# Patient Record
Sex: FEMALE | Employment: FULL TIME | URBAN - METROPOLITAN AREA
[De-identification: names, ages, dates, MRNs, and addresses within clinical notes are randomized per-mention and may not be internally consistent; named-entity substitution may affect disease eponyms.]

---

## 2017-04-13 ENCOUNTER — OFFICE VISIT (OUTPATIENT)
Dept: FAMILY MEDICINE CLINIC | Facility: CLINIC | Age: 42
End: 2017-04-13

## 2017-04-13 VITALS
TEMPERATURE: 99 F | WEIGHT: 158 LBS | BODY MASS INDEX: 28 KG/M2 | DIASTOLIC BLOOD PRESSURE: 70 MMHG | HEART RATE: 72 BPM | SYSTOLIC BLOOD PRESSURE: 107 MMHG | HEIGHT: 63 IN

## 2017-04-13 DIAGNOSIS — G56.03 BILATERAL CARPAL TUNNEL SYNDROME: ICD-10-CM

## 2017-04-13 DIAGNOSIS — Z00.00 ROUTINE PHYSICAL EXAMINATION: Primary | ICD-10-CM

## 2017-04-13 DIAGNOSIS — R73.01 IMPAIRED FASTING GLUCOSE: ICD-10-CM

## 2017-04-13 DIAGNOSIS — S03.40XA TMJ (SPRAIN OF TEMPOROMANDIBULAR JOINT), INITIAL ENCOUNTER: ICD-10-CM

## 2017-04-13 PROCEDURE — 90715 TDAP VACCINE 7 YRS/> IM: CPT | Performed by: FAMILY MEDICINE

## 2017-04-13 PROCEDURE — 99396 PREV VISIT EST AGE 40-64: CPT | Performed by: FAMILY MEDICINE

## 2017-04-13 PROCEDURE — 90471 IMMUNIZATION ADMIN: CPT | Performed by: FAMILY MEDICINE

## 2017-04-13 NOTE — PROGRESS NOTES
REASON FOR VISIT:    Shahid Hess is a 42year old female who presents for an [address].         Patient Active Problem List:     Routine physical examination     TMJ (sprain of temporomandibular joint)     Impaired fasting glucose Procedure   Cholesterol Screening Recommended screening varies with age, risk and gender No results found for: LDL, LDLC    Diabetes Screening  if history of high blood pressure or other  risk factors No results found for: A1C, HGBA1C  No results found for Kayla Safer Father    • Diabetes Mother    • Hypertension Mother    • Cancer Maternal Grandmother       SOCIAL HISTORY:     Smoking Status: Never Smoker                      Alcohol Use: Yes                Comment: Occ 1-2 per year    Occ:  Toni Shield B/L   NO CEREBELLAR SIGNS  ASSESSMENT AND OTHER RELEVANT CHRONIC CONDITIONS:   Pelon Roger is a 42year old female who presents for an [address].      PLAN SUMMARY:   Diagnoses and all orders for this visit:    Routine physical examin

## 2017-04-14 ENCOUNTER — APPOINTMENT (OUTPATIENT)
Dept: LAB | Age: 42
End: 2017-04-14
Attending: FAMILY MEDICINE
Payer: COMMERCIAL

## 2017-04-14 DIAGNOSIS — S03.40XA TMJ (SPRAIN OF TEMPOROMANDIBULAR JOINT), INITIAL ENCOUNTER: ICD-10-CM

## 2017-04-14 DIAGNOSIS — R73.01 IMPAIRED FASTING GLUCOSE: ICD-10-CM

## 2017-04-14 DIAGNOSIS — Z00.00 ROUTINE PHYSICAL EXAMINATION: ICD-10-CM

## 2017-04-14 PROCEDURE — 36415 COLL VENOUS BLD VENIPUNCTURE: CPT

## 2017-04-14 PROCEDURE — 80061 LIPID PANEL: CPT

## 2017-04-14 PROCEDURE — 82947 ASSAY GLUCOSE BLOOD QUANT: CPT

## 2017-04-14 PROCEDURE — 83036 HEMOGLOBIN GLYCOSYLATED A1C: CPT

## 2017-08-01 ENCOUNTER — TELEPHONE (OUTPATIENT)
Dept: FAMILY MEDICINE CLINIC | Facility: CLINIC | Age: 42
End: 2017-08-01

## 2017-08-01 NOTE — TELEPHONE ENCOUNTER
Actions Requested: appt made with  [facility] for Wed 9am at [facility]. Patient aware to go to ER if symptoms significantly worsen. Problem: abd pain, left side  Onset and Timing: comes/goes  Associated Symptoms: nausea  Aggravating by:    Alleviated by:   Triage No

## 2017-08-01 NOTE — TELEPHONE ENCOUNTER
FYI - Greenlandic Only    Patient is calling because she is experiencing abdominal pain - on her left side beneath the ribs, states it goes all the way to her back. States that it is also causing nausea. Originally called for an appt.  But majority of physician

## 2017-08-02 ENCOUNTER — TELEPHONE (OUTPATIENT)
Dept: GASTROENTEROLOGY | Facility: CLINIC | Age: 42
End: 2017-08-02

## 2017-08-02 ENCOUNTER — LAB ENCOUNTER (OUTPATIENT)
Dept: LAB | Age: 42
End: 2017-08-02
Attending: FAMILY MEDICINE
Payer: COMMERCIAL

## 2017-08-02 ENCOUNTER — OFFICE VISIT (OUTPATIENT)
Dept: FAMILY MEDICINE CLINIC | Facility: CLINIC | Age: 42
End: 2017-08-02

## 2017-08-02 VITALS
DIASTOLIC BLOOD PRESSURE: 85 MMHG | HEART RATE: 83 BPM | WEIGHT: 155 LBS | SYSTOLIC BLOOD PRESSURE: 122 MMHG | BODY MASS INDEX: 27 KG/M2

## 2017-08-02 DIAGNOSIS — R10.11 RUQ ABDOMINAL PAIN: ICD-10-CM

## 2017-08-02 DIAGNOSIS — K92.1 HEMATOCHEZIA: ICD-10-CM

## 2017-08-02 DIAGNOSIS — R10.12 LUQ ABDOMINAL PAIN: ICD-10-CM

## 2017-08-02 DIAGNOSIS — R10.12 LUQ ABDOMINAL PAIN: Primary | ICD-10-CM

## 2017-08-02 LAB
ALBUMIN SERPL BCP-MCNC: 3.6 G/DL (ref 3.5–4.8)
ALP SERPL-CCNC: 66 U/L (ref 32–100)
ALT SERPL-CCNC: 15 U/L (ref 14–54)
AMYLASE SERPL-CCNC: 55 U/L (ref 24–108)
APPEARANCE: CLEAR
AST SERPL-CCNC: 19 U/L (ref 15–41)
BASOPHILS # BLD: 0.1 K/UL (ref 0–0.2)
BASOPHILS NFR BLD: 1 %
BILIRUB DIRECT SERPL-MCNC: 0 MG/DL (ref 0–0.2)
BILIRUB SERPL-MCNC: 0.3 MG/DL (ref 0.3–1.2)
EOSINOPHIL # BLD: 0.4 K/UL (ref 0–0.7)
EOSINOPHIL NFR BLD: 6 %
ERYTHROCYTE [DISTWIDTH] IN BLOOD BY AUTOMATED COUNT: 14.8 % (ref 11–15)
HCG SERPL QL: NEGATIVE
HCT VFR BLD AUTO: 34.9 % (ref 35–48)
HGB BLD-MCNC: 11.2 G/DL (ref 12–16)
LIPASE SERPL-CCNC: 22 U/L (ref 22–51)
LYMPHOCYTES # BLD: 1.6 K/UL (ref 1–4)
LYMPHOCYTES NFR BLD: 24 %
MCH RBC QN AUTO: 26.6 PG (ref 27–32)
MCHC RBC AUTO-ENTMCNC: 32.2 G/DL (ref 32–37)
MCV RBC AUTO: 82.6 FL (ref 80–100)
MONOCYTES # BLD: 0.5 K/UL (ref 0–1)
MONOCYTES NFR BLD: 8 %
MULTISTIX LOT#: NORMAL NUMERIC
NEUTROPHILS # BLD AUTO: 4 K/UL (ref 1.8–7.7)
NEUTROPHILS NFR BLD: 61 %
OCCULT BLOOD: POSITIVE
PH, URINE: 8 (ref 4.5–8)
PLATELET # BLD AUTO: 250 K/UL (ref 140–400)
PMV BLD AUTO: 10.3 FL (ref 7.4–10.3)
PROT SERPL-MCNC: 6.9 G/DL (ref 5.9–8.4)
RBC # BLD AUTO: 4.22 M/UL (ref 3.7–5.4)
SPECIFIC GRAVITY: 1 (ref 1–1.03)
URINE-COLOR: YELLOW
WBC # BLD AUTO: 6.5 K/UL (ref 4–11)

## 2017-08-02 PROCEDURE — 81002 URINALYSIS NONAUTO W/O SCOPE: CPT | Performed by: FAMILY MEDICINE

## 2017-08-02 PROCEDURE — 84703 CHORIONIC GONADOTROPIN ASSAY: CPT

## 2017-08-02 PROCEDURE — 36415 COLL VENOUS BLD VENIPUNCTURE: CPT

## 2017-08-02 PROCEDURE — 99214 OFFICE O/P EST MOD 30 MIN: CPT | Performed by: FAMILY MEDICINE

## 2017-08-02 PROCEDURE — 82150 ASSAY OF AMYLASE: CPT

## 2017-08-02 PROCEDURE — 83690 ASSAY OF LIPASE: CPT

## 2017-08-02 PROCEDURE — 85025 COMPLETE CBC W/AUTO DIFF WBC: CPT

## 2017-08-02 PROCEDURE — 99212 OFFICE O/P EST SF 10 MIN: CPT | Performed by: FAMILY MEDICINE

## 2017-08-02 PROCEDURE — 80076 HEPATIC FUNCTION PANEL: CPT

## 2017-08-02 RX ORDER — NICOTINE POLACRILEX 4 MG/1
1 GUM, CHEWING ORAL DAILY
Qty: 30 TABLET | Refills: 3 | Status: SHIPPED | OUTPATIENT
Start: 2017-08-02 | End: 2017-09-01

## 2017-08-02 NOTE — TELEPHONE ENCOUNTER
Pt called back and accepted below appt with Jan Verdin at [facility] and given detailed directions to [facility] location.  States she has referral.

## 2017-08-02 NOTE — TELEPHONE ENCOUNTER
Ben Garcia from Dr Malgorzata Chun' office called. Pt would like to be seen for blood in stool.   Ben Garcia states pt already down at lab, but she will call down there and give pt appt tomorrow with Chaz POND at 7:30am at the [facility], [address], and will giv

## 2017-08-02 NOTE — PROGRESS NOTES
Blood pressure 122/85, pulse 83, weight 155 lb (70.3 kg), last menstrual period 08/02/2017. Patient presents today complaining of left upper quadrant abdominal pain she has had on and off for several months now increasing lately.   She has had her append

## 2017-08-03 ENCOUNTER — APPOINTMENT (OUTPATIENT)
Dept: LAB | Age: 42
End: 2017-08-03
Attending: FAMILY MEDICINE
Payer: COMMERCIAL

## 2017-08-03 ENCOUNTER — TELEPHONE (OUTPATIENT)
Dept: GASTROENTEROLOGY | Facility: CLINIC | Age: 42
End: 2017-08-03

## 2017-08-03 ENCOUNTER — TELEPHONE (OUTPATIENT)
Dept: FAMILY MEDICINE CLINIC | Facility: CLINIC | Age: 42
End: 2017-08-03

## 2017-08-03 ENCOUNTER — OFFICE VISIT (OUTPATIENT)
Dept: GASTROENTEROLOGY | Facility: CLINIC | Age: 42
End: 2017-08-03

## 2017-08-03 VITALS
SYSTOLIC BLOOD PRESSURE: 125 MMHG | HEIGHT: 64 IN | HEART RATE: 60 BPM | WEIGHT: 155 LBS | DIASTOLIC BLOOD PRESSURE: 84 MMHG | BODY MASS INDEX: 26.46 KG/M2

## 2017-08-03 DIAGNOSIS — D64.9 ANEMIA, UNSPECIFIED TYPE: Primary | ICD-10-CM

## 2017-08-03 DIAGNOSIS — R10.12 LUQ ABDOMINAL PAIN: ICD-10-CM

## 2017-08-03 DIAGNOSIS — R12 HEARTBURN: ICD-10-CM

## 2017-08-03 DIAGNOSIS — K62.5 RECTAL BLEEDING: ICD-10-CM

## 2017-08-03 DIAGNOSIS — R10.12 LUQ PAIN: ICD-10-CM

## 2017-08-03 DIAGNOSIS — K92.1 HEMATOCHEZIA: ICD-10-CM

## 2017-08-03 DIAGNOSIS — R31.9 HEMATURIA, UNSPECIFIED TYPE: Primary | ICD-10-CM

## 2017-08-03 DIAGNOSIS — R10.11 RUQ ABDOMINAL PAIN: ICD-10-CM

## 2017-08-03 PROCEDURE — 99212 OFFICE O/P EST SF 10 MIN: CPT | Performed by: PHYSICIAN ASSISTANT

## 2017-08-03 PROCEDURE — 99244 OFF/OP CNSLTJ NEW/EST MOD 40: CPT | Performed by: PHYSICIAN ASSISTANT

## 2017-08-03 PROCEDURE — 87338 HPYLORI STOOL AG IA: CPT

## 2017-08-03 NOTE — TELEPHONE ENCOUNTER
Verbal authorization by [facility] [place] to order UA. Patient made aware to complete blood test after finishing menstrual cycle.

## 2017-08-03 NOTE — PATIENT INSTRUCTIONS
1. Constipation: I want you to start Miralax each day (over the counter in a purple bottle). You can start with 17 g (or one capful) in a glass of water or juice in the morning. Try this for 2 weeks. You can go up to twice a day with this.   - Drink at leas

## 2017-08-03 NOTE — TELEPHONE ENCOUNTER
Scheduled for:  Colon  Provider Name: Dr. Boris Negron  Date:  9-8-17  Location:  [facility]  Sedation:  IV sedation   Time:  10am, arrival 9am  Prep: Colyte  Meds/Allergies Reconciled?:  Yes   Diagnosis with codes:  Anemia D64.9, rectal bleeding K62.5  Was patient infor

## 2017-08-03 NOTE — H&P
[address] [facility] [address] Gastroenterology                                                                                                  Clinic History and Physical     Pa when she tries her home remedies, she has a bowel movement each day. She does admit to hard stools and she sees blood in the stool with BRBPR. Last time she saw blood was 2 days ago. Normal BM this AM without blood or straining.  Does admit she does not shortness of breath  CARDIOVASCULAR:  negative for crushing sub-sternal chest pain  GASTROINTESTINAL:  see HPI  GENITOURINARY:  negative for dysuria or gross hematuria  INTEGUMENT/BREAST:  SKIN:  negative for jaundice   ALLERGIC/IMMUNOLOGIC:  negative for enzymes were WNL. No pain presently during examination. ? MSK versus gastric versus biliary (although normal hepatic enzymes)  #Heartburn: no worrisome symptoms presently to the patient, started on PPI only today, therefore efficacy cannot be truly gauged. demonstrated understanding], including but not limited to the risks of bleeding, infection, pain, death, as well as the risks of anesthesia and perforation all leading to prolonged hospitalization, surgical intervention, or even death.  I also specifically

## 2017-08-03 NOTE — TELEPHONE ENCOUNTER
----- Message from Ignacio Jo DO sent at 8/3/2017  1:02 PM CDT -----  PLEASE CHECK IF UA WAS SENT TO LAB FOR MICRO/CULTURE

## 2017-08-04 ENCOUNTER — HOSPITAL ENCOUNTER (OUTPATIENT)
Dept: ULTRASOUND IMAGING | Facility: HOSPITAL | Age: 42
Discharge: HOME | End: 2017-08-04
Attending: FAMILY MEDICINE
Payer: COMMERCIAL

## 2017-08-04 DIAGNOSIS — R10.11 RUQ ABDOMINAL PAIN: ICD-10-CM

## 2017-08-04 PROCEDURE — 76705 ECHO EXAM OF ABDOMEN: CPT | Performed by: FAMILY MEDICINE

## 2017-08-04 NOTE — H&P
I have reviewed the medical record including the very thorough consultation as outlined by Pietro Wakefield PA-C, recent labs, and medical history.  I agree with plan for anti-reflux precautions and meds, observation for constipation, and  colonoscopy with consc

## 2017-08-05 LAB — HELICOBACTER PYLORI AG, FECAL: NEGATIVE

## 2017-08-07 ENCOUNTER — TELEPHONE (OUTPATIENT)
Dept: FAMILY MEDICINE CLINIC | Facility: CLINIC | Age: 42
End: 2017-08-07

## 2017-08-07 ENCOUNTER — OFFICE VISIT (OUTPATIENT)
Dept: FAMILY MEDICINE CLINIC | Facility: CLINIC | Age: 42
End: 2017-08-07

## 2017-08-07 VITALS
BODY MASS INDEX: 25.95 KG/M2 | HEIGHT: 64 IN | DIASTOLIC BLOOD PRESSURE: 63 MMHG | SYSTOLIC BLOOD PRESSURE: 93 MMHG | HEART RATE: 88 BPM | WEIGHT: 152 LBS

## 2017-08-07 DIAGNOSIS — J03.90 TONSILLITIS WITH EXUDATE: Primary | ICD-10-CM

## 2017-08-07 PROCEDURE — 99214 OFFICE O/P EST MOD 30 MIN: CPT | Performed by: NURSE PRACTITIONER

## 2017-08-07 RX ORDER — AZITHROMYCIN 250 MG/1
TABLET, FILM COATED ORAL
Qty: 6 TABLET | Refills: 0 | Status: SHIPPED | OUTPATIENT
Start: 2017-08-07 | End: 2018-01-29

## 2017-08-07 NOTE — TELEPHONE ENCOUNTER
Actions Requested: Patient given appt 1:45pm today with Jenni Mckeon for evaluation at [facility]. Problem: fever, sore throat,   Onset and Timin days ago  Associated Symptoms: joint pain. Aggravating by: Alleviated by:   Triage Note: denied cough.  No sob

## 2017-08-07 NOTE — PATIENT INSTRUCTIONS
Pharyngitis: Strep (Presumed)    You have pharyngitis (sore throat). The cause is thought to be the streptococcus, or strep, bacterium. Strep throat infection can cause throat pain that is worse when swallowing, aching all over, headache, and fever.  The · Inability to swallow liquids, excessive drooling, or inability to open mouth wide due to throat pain  · Signs of dehydration (very dark urine or no urine, sunken eyes, dizziness)  · Trouble breathing or noisy breathing  · Muffled voice  · New rash  Date -complete course of antibiotics as prescribed-not completing course of antibiotics may result in infection not fully eradicated or may lead to antibiotic resistance  -eat probiotic yogurt (Activia)  or take probiotic capsules i.e Align or Florastor (sprink

## 2017-08-07 NOTE — PROGRESS NOTES
Sore Throat    This is a new problem. The current episode started in the past 7 days. The problem has been gradually worsening. The maximum temperature recorded prior to her arrival was 102 - 102.9 F. The fever has been present for 1 to 2 days.  The pain is delivery     • Appendectomy         Family History   Problem Relation Age of Onset   • Other [OTHER] Father    • Diabetes Mother    • Hypertension Mother    • Cancer Maternal Grandmother          Social History  Social History   Marital status:   Sp a normal mood and affect. Her behavior is normal. Judgment and thought content normal.   Nursing note and vitals reviewed. Assessment:     1.  Tonsillitis with exudate        Plan:     1. zpack as directed  Supportive care discussed  Call if s/s not im

## 2017-08-26 ENCOUNTER — HOSPITAL ENCOUNTER (OUTPATIENT)
Dept: MAMMOGRAPHY | Facility: HOSPITAL | Age: 42
Discharge: HOME | End: 2017-08-26
Attending: FAMILY MEDICINE
Payer: COMMERCIAL

## 2017-08-26 DIAGNOSIS — K92.1 HEMATOCHEZIA: ICD-10-CM

## 2017-08-26 PROCEDURE — 77067 SCR MAMMO BI INCL CAD: CPT | Performed by: FAMILY MEDICINE

## 2017-09-08 ENCOUNTER — SURGERY (OUTPATIENT)
Age: 42
End: 2017-09-08

## 2017-09-08 ENCOUNTER — HOSPITAL ENCOUNTER (OUTPATIENT)
Facility: HOSPITAL | Age: 42
Setting detail: HOSPITAL OUTPATIENT SURGERY
Discharge: HOME | End: 2017-09-08
Attending: INTERNAL MEDICINE | Admitting: INTERNAL MEDICINE
Payer: COMMERCIAL

## 2017-09-08 VITALS
WEIGHT: 155 LBS | SYSTOLIC BLOOD PRESSURE: 102 MMHG | HEIGHT: 64 IN | RESPIRATION RATE: 14 BRPM | HEART RATE: 56 BPM | DIASTOLIC BLOOD PRESSURE: 67 MMHG | BODY MASS INDEX: 26.46 KG/M2 | OXYGEN SATURATION: 100 %

## 2017-09-08 PROBLEM — K64.8 INTERNAL HEMORRHOIDS: Status: ACTIVE | Noted: 2017-09-08

## 2017-09-08 LAB — B-HCG UR QL: NEGATIVE

## 2017-09-08 PROCEDURE — 45378 DIAGNOSTIC COLONOSCOPY: CPT | Performed by: INTERNAL MEDICINE

## 2017-09-08 PROCEDURE — 0DJD8ZZ INSPECTION OF LOWER INTESTINAL TRACT, VIA NATURAL OR ARTIFICIAL OPENING ENDOSCOPIC: ICD-10-PCS | Performed by: INTERNAL MEDICINE

## 2017-09-08 RX ORDER — MIDAZOLAM HYDROCHLORIDE 1 MG/ML
INJECTION INTRAMUSCULAR; INTRAVENOUS
Status: DISCONTINUED | OUTPATIENT
Start: 2017-09-08 | End: 2017-09-08

## 2017-09-08 RX ORDER — SODIUM CHLORIDE 0.9 % (FLUSH) 0.9 %
10 SYRINGE (ML) INJECTION AS NEEDED
Status: DISCONTINUED | OUTPATIENT
Start: 2017-09-08 | End: 2017-09-08

## 2017-09-08 RX ORDER — SODIUM CHLORIDE, SODIUM LACTATE, POTASSIUM CHLORIDE, CALCIUM CHLORIDE 600; 310; 30; 20 MG/100ML; MG/100ML; MG/100ML; MG/100ML
INJECTION, SOLUTION INTRAVENOUS CONTINUOUS
Status: DISCONTINUED | OUTPATIENT
Start: 2017-09-08 | End: 2017-09-08

## 2017-09-08 RX ORDER — MIDAZOLAM HYDROCHLORIDE 1 MG/ML
1 INJECTION INTRAMUSCULAR; INTRAVENOUS EVERY 5 MIN PRN
Status: DISCONTINUED | OUTPATIENT
Start: 2017-09-08 | End: 2017-09-08

## 2017-09-08 NOTE — BRIEF OP NOTE
Pre-Operative Diagnosis: rectal bleeding , anemia     Post-Operative Diagnosis: Normal colonoscopy, small internal hemorrhoids     Procedure Performed:   Procedure(s):  COLONOSCOPY    Surgeon(s) and Role:     * Tj Quiles MD - Primary

## 2017-09-08 NOTE — H&P
History & Physical Examination    Patient Name: Katie Cotto  MRN: [id number]  [facility]: [id number]  YOB: 1975    Diagnosis: rectal bleeding, anemia      Prescriptions Prior to Admission:  azithromycin (ZITHROMAX Z-CIRO) 250 MG Or

## 2017-09-09 NOTE — OPERATIVE REPORT
[facility]    PATIENT'S NAME: Sheridan Rom   ATTENDING PHYSICIAN: Morgan Galdamez MD   OPERATING PHYSICIAN: Morgan Galdamez MD   PATIENT ACCOUNT#:   [id number]    LOCATION:  [facility] ROOM 6 biopsies. Otherwise next colonoscopy for the purpose of screening would be in 10 years unless other signs or symptoms develop in the interim.       Dictated By Itzel Cantu MD  d: 09/08/2017 10:58:05  t: 09/08/2017 19:55:39  [facility] [id number]

## 2017-09-11 ENCOUNTER — TELEPHONE (OUTPATIENT)
Dept: GASTROENTEROLOGY | Facility: CLINIC | Age: 42
End: 2017-09-11

## 2017-09-11 NOTE — TELEPHONE ENCOUNTER
Recall colon in 10 years per Dr. Dominic Hauser. Last Colon done 9/8/17, next due 9/8/2027. Snapshot updated.

## 2017-09-28 ENCOUNTER — TELEPHONE (OUTPATIENT)
Dept: FAMILY MEDICINE CLINIC | Facility: CLINIC | Age: 42
End: 2017-09-28

## 2017-11-07 ENCOUNTER — TELEPHONE (OUTPATIENT)
Dept: GASTROENTEROLOGY | Facility: CLINIC | Age: 42
End: 2017-11-07

## 2017-11-07 NOTE — TELEPHONE ENCOUNTER
I called and spoke to pt about her overdue labs that are still pending that SALTY Wood would like for her to get completed.   Pt agreed and stated she would stop by the hospital     em

## 2018-01-29 ENCOUNTER — OFFICE VISIT (OUTPATIENT)
Dept: FAMILY MEDICINE CLINIC | Facility: CLINIC | Age: 43
End: 2018-01-29

## 2018-01-29 VITALS
TEMPERATURE: 99 F | HEIGHT: 64 IN | WEIGHT: 151 LBS | BODY MASS INDEX: 25.78 KG/M2 | SYSTOLIC BLOOD PRESSURE: 111 MMHG | HEART RATE: 95 BPM | DIASTOLIC BLOOD PRESSURE: 72 MMHG

## 2018-01-29 DIAGNOSIS — J02.0 STREP PHARYNGITIS: ICD-10-CM

## 2018-01-29 DIAGNOSIS — J03.90 TONSILLITIS: Primary | ICD-10-CM

## 2018-01-29 LAB
CONTROL LINE PRESENT WITH A CLEAR BACKGROUND (YES/NO): YES YES/NO
KIT LOT #: NORMAL NUMERIC
STREP GRP A CUL-SCR: POSITIVE

## 2018-01-29 PROCEDURE — 99212 OFFICE O/P EST SF 10 MIN: CPT | Performed by: FAMILY MEDICINE

## 2018-01-29 PROCEDURE — 87880 STREP A ASSAY W/OPTIC: CPT | Performed by: FAMILY MEDICINE

## 2018-01-29 PROCEDURE — 99213 OFFICE O/P EST LOW 20 MIN: CPT | Performed by: FAMILY MEDICINE

## 2018-01-29 RX ORDER — AZITHROMYCIN 250 MG/1
TABLET, FILM COATED ORAL
Qty: 6 TABLET | Refills: 0 | Status: SHIPPED | OUTPATIENT
Start: 2018-01-29 | End: 2018-02-03

## 2018-01-29 RX ORDER — IBUPROFEN 600 MG/1
600 TABLET ORAL EVERY 8 HOURS PRN
Qty: 30 TABLET | Refills: 0 | Status: SHIPPED | OUTPATIENT
Start: 2018-01-29 | End: 2018-02-08

## 2018-01-29 NOTE — PROGRESS NOTES
Patient ID: Jeffrey Quinonez is a 36year old female. HPI  Patient presents with:  Fever  Sore Throat    She states for 2 days now she has had a sore throat. She had a fever yesterday and Saturday but none today.   She states it still hu No distress. HENT:   Head: Normocephalic. Right Ear: Tympanic membrane, external ear and ear canal normal.   Left Ear: Tympanic membrane, external ear and ear canal normal.   Nose: No mucosal edema or rhinorrhea.    THROAT: Oropharynx shows 3+ tonsils w

## 2018-02-19 ENCOUNTER — LAB REQUISITION (OUTPATIENT)
Dept: LAB | Facility: HOSPITAL | Age: 43
End: 2018-02-19
Payer: COMMERCIAL

## 2018-02-19 DIAGNOSIS — Z01.419 ENCOUNTER FOR GYNECOLOGICAL EXAMINATION WITHOUT ABNORMAL FINDING: ICD-10-CM

## 2018-02-19 PROCEDURE — 87624 HPV HI-RISK TYP POOLED RSLT: CPT | Performed by: OBSTETRICS & GYNECOLOGY

## 2018-02-19 PROCEDURE — 87591 N.GONORRHOEAE DNA AMP PROB: CPT | Performed by: OBSTETRICS & GYNECOLOGY

## 2018-02-19 PROCEDURE — 87491 CHLMYD TRACH DNA AMP PROBE: CPT | Performed by: OBSTETRICS & GYNECOLOGY

## 2018-02-19 PROCEDURE — 88175 CYTOPATH C/V AUTO FLUID REDO: CPT | Performed by: OBSTETRICS & GYNECOLOGY

## 2018-02-20 LAB
C TRACH DNA SPEC QL NAA+PROBE: NEGATIVE
HPV I/H RISK 1 DNA SPEC QL NAA+PROBE: NEGATIVE
N GONORRHOEA DNA SPEC QL NAA+PROBE: NEGATIVE

## 2018-02-21 ENCOUNTER — TELEPHONE (OUTPATIENT)
Dept: FAMILY MEDICINE CLINIC | Facility: CLINIC | Age: 43
End: 2018-02-21

## 2018-02-21 NOTE — TELEPHONE ENCOUNTER
Call transferred by CSS: Patient informed of results (identified name and ) and recommendations, as per Dr. Ksenia Nicole result note copied below. Patient voiced understanding and agrees with recommendations.

## 2018-02-21 NOTE — TELEPHONE ENCOUNTER
----- Message from Ivaan Bhandari MD sent at 2/20/2018  2:28 PM CST -----  Normal pap.  Repeat in 3-5 years but should still having yearly physical.

## 2018-02-24 ENCOUNTER — LAB REQUISITION (OUTPATIENT)
Dept: LAB | Facility: HOSPITAL | Age: 43
End: 2018-02-24
Payer: COMMERCIAL

## 2018-02-24 DIAGNOSIS — N64.3 GALACTORRHEA NOT ASSOCIATED WITH CHILDBIRTH: ICD-10-CM

## 2018-02-24 LAB
PROLACTIN SERPL-MCNC: 11.3 NG/ML (ref 1.4–24.2)
TSH SERPL-ACNC: 1.44 UIU/ML (ref 0.45–5.33)

## 2018-02-24 PROCEDURE — 84146 ASSAY OF PROLACTIN: CPT | Performed by: OBSTETRICS & GYNECOLOGY

## 2018-02-24 PROCEDURE — 84443 ASSAY THYROID STIM HORMONE: CPT | Performed by: OBSTETRICS & GYNECOLOGY

## 2018-03-10 ENCOUNTER — HOSPITAL ENCOUNTER (OUTPATIENT)
Dept: ULTRASOUND IMAGING | Facility: HOSPITAL | Age: 43
Discharge: HOME | End: 2018-03-10
Attending: OBSTETRICS & GYNECOLOGY
Payer: COMMERCIAL

## 2018-03-10 DIAGNOSIS — R10.2 PELVIC PAIN IN FEMALE: ICD-10-CM

## 2018-03-10 PROCEDURE — 76856 US EXAM PELVIC COMPLETE: CPT | Performed by: OBSTETRICS & GYNECOLOGY

## 2018-03-10 PROCEDURE — 93975 VASCULAR STUDY: CPT | Performed by: OBSTETRICS & GYNECOLOGY

## 2018-03-10 PROCEDURE — 76830 TRANSVAGINAL US NON-OB: CPT | Performed by: OBSTETRICS & GYNECOLOGY

## 2018-05-10 ENCOUNTER — LAB REQUISITION (OUTPATIENT)
Dept: LAB | Facility: HOSPITAL | Age: 43
End: 2018-05-10
Payer: COMMERCIAL

## 2018-05-10 DIAGNOSIS — N72 INFLAMMATORY DISEASE OF UTERINE CERVIX: ICD-10-CM

## 2018-05-10 PROCEDURE — 87591 N.GONORRHOEAE DNA AMP PROB: CPT | Performed by: OBSTETRICS & GYNECOLOGY

## 2018-05-10 PROCEDURE — 87491 CHLMYD TRACH DNA AMP PROBE: CPT | Performed by: OBSTETRICS & GYNECOLOGY

## 2018-06-21 PROCEDURE — 88305 TISSUE EXAM BY PATHOLOGIST: CPT | Performed by: OBSTETRICS & GYNECOLOGY

## 2018-06-25 ENCOUNTER — LAB REQUISITION (OUTPATIENT)
Dept: LAB | Facility: HOSPITAL | Age: 43
End: 2018-06-25
Payer: COMMERCIAL

## 2018-06-25 DIAGNOSIS — N92.0 EXCESSIVE AND FREQUENT MENSTRUATION WITH REGULAR CYCLE: ICD-10-CM

## 2018-10-08 ENCOUNTER — OFFICE VISIT (OUTPATIENT)
Dept: FAMILY MEDICINE CLINIC | Facility: CLINIC | Age: 43
End: 2018-10-08

## 2018-10-08 ENCOUNTER — LAB ENCOUNTER (OUTPATIENT)
Dept: LAB | Age: 43
End: 2018-10-08
Attending: FAMILY MEDICINE
Payer: COMMERCIAL

## 2018-10-08 VITALS
HEART RATE: 69 BPM | BODY MASS INDEX: 27 KG/M2 | SYSTOLIC BLOOD PRESSURE: 129 MMHG | WEIGHT: 157 LBS | DIASTOLIC BLOOD PRESSURE: 87 MMHG

## 2018-10-08 DIAGNOSIS — L50.0 ALLERGIC URTICARIA: Primary | ICD-10-CM

## 2018-10-08 DIAGNOSIS — L50.0 ALLERGIC URTICARIA: ICD-10-CM

## 2018-10-08 PROCEDURE — 99212 OFFICE O/P EST SF 10 MIN: CPT | Performed by: FAMILY MEDICINE

## 2018-10-08 PROCEDURE — 82785 ASSAY OF IGE: CPT

## 2018-10-08 PROCEDURE — 96372 THER/PROPH/DIAG INJ SC/IM: CPT | Performed by: FAMILY MEDICINE

## 2018-10-08 PROCEDURE — 86003 ALLG SPEC IGE CRUDE XTRC EA: CPT

## 2018-10-08 PROCEDURE — 36415 COLL VENOUS BLD VENIPUNCTURE: CPT

## 2018-10-08 PROCEDURE — 99214 OFFICE O/P EST MOD 30 MIN: CPT | Performed by: FAMILY MEDICINE

## 2018-10-08 RX ORDER — MOMETASONE FUROATE 1 MG/G
CREAM TOPICAL
Qty: 30 G | Refills: 1 | Status: SHIPPED | OUTPATIENT
Start: 2018-10-08 | End: 2021-04-01

## 2018-10-08 RX ORDER — CETIRIZINE HYDROCHLORIDE 10 MG/1
10 TABLET ORAL DAILY
Qty: 90 TABLET | Refills: 1 | Status: SHIPPED | OUTPATIENT
Start: 2018-10-08 | End: 2018-11-14

## 2018-10-08 RX ORDER — METHYLPREDNISOLONE ACETATE 80 MG/ML
80 INJECTION, SUSPENSION INTRA-ARTICULAR; INTRALESIONAL; INTRAMUSCULAR; SOFT TISSUE ONCE
Status: COMPLETED | OUTPATIENT
Start: 2018-10-08 | End: 2018-10-08

## 2018-10-08 RX ADMIN — METHYLPREDNISOLONE ACETATE 80 MG: 80 INJECTION, SUSPENSION INTRA-ARTICULAR; INTRALESIONAL; INTRAMUSCULAR; SOFT TISSUE at 15:21:00

## 2018-10-08 NOTE — PROGRESS NOTES
10/8/2018  2:45 PM    Claudia Bazan is a 39year old female. Chief complaint(s): Patient presents with:  Rash: all over body off and on X 4 months    HPI:     Claudia Bazan primary complaint is regarding rash.        Verona Sosa Allergies:    Penicillins             SWELLING      ROS:   Review of Systems   Constitutional: Negative for chills, fatigue and fever. HENT: Negative for ear pain and sore throat. Respiratory: Negative for cough and wheezing.     Cardiovascular: Endometrium; biopsy:  · Multiple fragments of proliferative phase endometrium, with focal features of shedding. · No evidence of endometrial hyperplasia, atypia, or malignancy is identified. .          Embedded Images      Clinical Information Cream 30 g 1     Sig: Apply to affected area(s) BID       Imaging & Referrals:  None         Karey Galeazzi, MD

## 2018-10-11 ENCOUNTER — TELEPHONE (OUTPATIENT)
Dept: FAMILY MEDICINE CLINIC | Facility: CLINIC | Age: 43
End: 2018-10-11

## 2018-10-20 ENCOUNTER — OFFICE VISIT (OUTPATIENT)
Dept: FAMILY MEDICINE CLINIC | Facility: CLINIC | Age: 43
End: 2018-10-20

## 2018-10-20 VITALS
DIASTOLIC BLOOD PRESSURE: 80 MMHG | HEIGHT: 64 IN | SYSTOLIC BLOOD PRESSURE: 121 MMHG | HEART RATE: 84 BPM | BODY MASS INDEX: 26.7 KG/M2 | WEIGHT: 156.38 LBS

## 2018-10-20 DIAGNOSIS — R79.9 ABNORMAL BLOOD CHEMISTRY: Primary | ICD-10-CM

## 2018-10-20 PROCEDURE — 99214 OFFICE O/P EST MOD 30 MIN: CPT | Performed by: FAMILY MEDICINE

## 2018-10-20 PROCEDURE — 99212 OFFICE O/P EST SF 10 MIN: CPT | Performed by: FAMILY MEDICINE

## 2018-10-20 RX ORDER — SUMATRIPTAN 50 MG/1
TABLET, FILM COATED ORAL
Qty: 12 TABLET | Refills: 1 | Status: SHIPPED | OUTPATIENT
Start: 2018-10-20 | End: 2021-04-01

## 2018-10-20 NOTE — PROGRESS NOTES
Blood pressure 121/80, pulse 84, height 5' 4\" (1.626 m), weight 156 lb 6 oz (70.9 kg). Patient presents today following up for abnormal blood chemistry showed slight anemia elevated glycohemoglobin. She reports she has chronic headaches.   They are bit

## 2018-10-27 ENCOUNTER — LAB ENCOUNTER (OUTPATIENT)
Dept: LAB | Facility: HOSPITAL | Age: 43
End: 2018-10-27
Attending: FAMILY MEDICINE
Payer: COMMERCIAL

## 2018-10-27 DIAGNOSIS — R79.9 ABNORMAL BLOOD CHEMISTRY: ICD-10-CM

## 2018-10-27 PROCEDURE — 36415 COLL VENOUS BLD VENIPUNCTURE: CPT

## 2018-10-27 PROCEDURE — 85025 COMPLETE CBC W/AUTO DIFF WBC: CPT

## 2018-10-27 PROCEDURE — 84443 ASSAY THYROID STIM HORMONE: CPT

## 2018-10-27 PROCEDURE — 82947 ASSAY GLUCOSE BLOOD QUANT: CPT

## 2018-10-27 PROCEDURE — 83036 HEMOGLOBIN GLYCOSYLATED A1C: CPT

## 2018-11-10 ENCOUNTER — OFFICE VISIT (OUTPATIENT)
Dept: FAMILY MEDICINE CLINIC | Facility: CLINIC | Age: 43
End: 2018-11-10

## 2018-11-10 VITALS
TEMPERATURE: 98 F | SYSTOLIC BLOOD PRESSURE: 100 MMHG | HEIGHT: 65 IN | DIASTOLIC BLOOD PRESSURE: 61 MMHG | WEIGHT: 157 LBS | HEART RATE: 60 BPM | BODY MASS INDEX: 26.16 KG/M2

## 2018-11-10 DIAGNOSIS — L23.9 ALLERGIC DERMATITIS: Primary | ICD-10-CM

## 2018-11-10 PROCEDURE — 99213 OFFICE O/P EST LOW 20 MIN: CPT | Performed by: FAMILY MEDICINE

## 2018-11-10 PROCEDURE — 96372 THER/PROPH/DIAG INJ SC/IM: CPT | Performed by: FAMILY MEDICINE

## 2018-11-10 PROCEDURE — 99212 OFFICE O/P EST SF 10 MIN: CPT | Performed by: FAMILY MEDICINE

## 2018-11-10 RX ORDER — METHYLPREDNISOLONE ACETATE 80 MG/ML
80 INJECTION, SUSPENSION INTRA-ARTICULAR; INTRALESIONAL; INTRAMUSCULAR; SOFT TISSUE ONCE
Status: COMPLETED | OUTPATIENT
Start: 2018-11-10 | End: 2018-11-10

## 2018-11-10 RX ORDER — DESLORATADINE 5 MG/1
5 TABLET ORAL DAILY
Qty: 90 TABLET | Refills: 3 | Status: SHIPPED | OUTPATIENT
Start: 2018-11-10 | End: 2018-11-14

## 2018-11-10 RX ORDER — LORAZEPAM 1 MG/1
1 TABLET ORAL NIGHTLY
Qty: 30 TABLET | Refills: 0 | Status: SHIPPED | OUTPATIENT
Start: 2018-11-10 | End: 2021-04-01

## 2018-11-10 RX ADMIN — METHYLPREDNISOLONE ACETATE 80 MG: 80 INJECTION, SUSPENSION INTRA-ARTICULAR; INTRALESIONAL; INTRAMUSCULAR; SOFT TISSUE at 13:08:00

## 2018-11-10 NOTE — PROGRESS NOTES
11/10/2018  10:27 AM    Denisha Brooks is a 39year old female. Chief complaint(s): Patient presents with:  Derm Problem: F/U for Pruritic Rash involving abdominal area and lower extremities.      HPI:     Denisha Brooks tablet Rfl: 3   LORazepam (ATIVAN) 1 MG Oral Tab Take 1 tablet (1 mg total) by mouth nightly.  Disp: 30 tablet Rfl: 0   Mometasone Furoate 0.1 % External Cream Apply to affected area(s) BID Disp: 30 g Rfl: 1   SUMAtriptan Succinate 50 MG Oral Tab TAKE 1 TAB EKG / Spirometry : -    Radiology: No results found.      ASSESSMENT/PLAN:   Assessment   Allergic dermatitis  (primary encounter diagnosis)    MEDICATIONS:     Requested Prescriptions     Signed Prescriptions Disp Refills   • Desloratadine (CLARINEX) 5

## 2018-11-14 ENCOUNTER — OFFICE VISIT (OUTPATIENT)
Dept: ALLERGY | Facility: CLINIC | Age: 43
End: 2018-11-14

## 2018-11-14 ENCOUNTER — NURSE ONLY (OUTPATIENT)
Dept: ALLERGY | Facility: CLINIC | Age: 43
End: 2018-11-14

## 2018-11-14 VITALS
RESPIRATION RATE: 16 BRPM | SYSTOLIC BLOOD PRESSURE: 99 MMHG | BODY MASS INDEX: 26.66 KG/M2 | HEIGHT: 65 IN | OXYGEN SATURATION: 100 % | WEIGHT: 160 LBS | HEART RATE: 61 BPM | TEMPERATURE: 98 F | DIASTOLIC BLOOD PRESSURE: 65 MMHG

## 2018-11-14 DIAGNOSIS — L50.9 URTICARIA: ICD-10-CM

## 2018-11-14 DIAGNOSIS — L30.9 DERMATITIS: ICD-10-CM

## 2018-11-14 DIAGNOSIS — L30.9 DERMATITIS: Primary | ICD-10-CM

## 2018-11-14 PROCEDURE — 95024 IQ TESTS W/ALLERGENIC XTRCS: CPT | Performed by: ALLERGY & IMMUNOLOGY

## 2018-11-14 PROCEDURE — 99212 OFFICE O/P EST SF 10 MIN: CPT | Performed by: ALLERGY & IMMUNOLOGY

## 2018-11-14 PROCEDURE — 99244 OFF/OP CNSLTJ NEW/EST MOD 40: CPT | Performed by: ALLERGY & IMMUNOLOGY

## 2018-11-14 PROCEDURE — 95004 PERQ TESTS W/ALRGNC XTRCS: CPT | Performed by: ALLERGY & IMMUNOLOGY

## 2018-11-14 RX ORDER — LEVOCETIRIZINE DIHYDROCHLORIDE 5 MG/1
5 TABLET, FILM COATED ORAL NIGHTLY
Qty: 30 TABLET | Refills: 0 | Status: SHIPPED | OUTPATIENT
Start: 2018-11-14 | End: 2021-04-01

## 2018-11-14 NOTE — PROGRESS NOTES
Emily Form is a 39year old female.     HPI:   Patient presents with:  Dermatitis: Rash on stomach and both arms itchy x 6 months        Rash:  Duration: 6 months ago   Symptoms:  Itchy, red, lines and circles   Denies lip or tongue swe Medications:  Desloratadine (CLARINEX) 5 MG Oral Tab Take 1 tablet (5 mg total) by mouth daily. Disp: 90 tablet Rfl: 3   LORazepam (ATIVAN) 1 MG Oral Tab Take 1 tablet (1 mg total) by mouth nightly.  Disp: 30 tablet Rfl: 0   SUMAtriptan Succinate 50 MG Oral no abnormal cervical, supraclavicular or axillary adenopathy is noted  Respiratory: normal to inspection lungs are clear to auscultation bilaterally normal respiratory effort   Cardiovascular: regular rate and rhythm no murmurs, gallups, or rubs  Abdomen: education and training related to self administration of these medications. Teaching, instruction and sample was provided to the patient by myself. Teaching included  a review of potential adverse side effects as well as potential efficacy.   Patient's qu

## 2018-11-14 NOTE — PATIENT INSTRUCTIONS
Recs: Trial of Xyzal, levo cetirizine 5 mill once a night at bedtime  May increase to every 12 hours if needed  Benadryl 25 mg every 4-6 hours as needed    Follow-up in approximately 4-6 weeks or sooner if needed

## 2020-02-17 ENCOUNTER — OFFICE VISIT (OUTPATIENT)
Dept: OBGYN CLINIC | Facility: CLINIC | Age: 45
End: 2020-02-17

## 2020-02-17 ENCOUNTER — LAB ENCOUNTER (OUTPATIENT)
Dept: LAB | Age: 45
End: 2020-02-17
Attending: OBSTETRICS & GYNECOLOGY
Payer: COMMERCIAL

## 2020-02-17 VITALS
BODY MASS INDEX: 28 KG/M2 | DIASTOLIC BLOOD PRESSURE: 70 MMHG | HEART RATE: 84 BPM | WEIGHT: 165.38 LBS | SYSTOLIC BLOOD PRESSURE: 128 MMHG

## 2020-02-17 DIAGNOSIS — R23.2 HOT FLASHES: ICD-10-CM

## 2020-02-17 DIAGNOSIS — B37.3 VULVOVAGINITIS DUE TO YEAST: ICD-10-CM

## 2020-02-17 DIAGNOSIS — Z12.31 ENCOUNTER FOR SCREENING MAMMOGRAM FOR BREAST CANCER: ICD-10-CM

## 2020-02-17 DIAGNOSIS — N92.0 MENORRHAGIA WITH REGULAR CYCLE: ICD-10-CM

## 2020-02-17 DIAGNOSIS — Z01.419 ENCOUNTER FOR WELL WOMAN EXAM WITH ROUTINE GYNECOLOGICAL EXAM: Primary | ICD-10-CM

## 2020-02-17 DIAGNOSIS — N89.8 VAGINAL DISCHARGE: ICD-10-CM

## 2020-02-17 PROBLEM — B37.31 VULVOVAGINITIS DUE TO YEAST: Status: ACTIVE | Noted: 2020-02-17

## 2020-02-17 LAB
BASOPHILS # BLD AUTO: 0.07 X10(3) UL (ref 0–0.2)
BASOPHILS NFR BLD AUTO: 0.8 %
DEPRECATED RDW RBC AUTO: 45.1 FL (ref 35.1–46.3)
EOSINOPHIL # BLD AUTO: 0.89 X10(3) UL (ref 0–0.7)
EOSINOPHIL NFR BLD AUTO: 10 %
ERYTHROCYTE [DISTWIDTH] IN BLOOD BY AUTOMATED COUNT: 15.4 % (ref 11–15)
EST. AVERAGE GLUCOSE BLD GHB EST-MCNC: 120 MG/DL (ref 68–126)
FSH SERPL-ACNC: 3.7 MIU/ML
GLUCOSE BLD-MCNC: 86 MG/DL (ref 70–99)
HBA1C MFR BLD HPLC: 5.8 % (ref ?–5.7)
HCT VFR BLD AUTO: 35.6 % (ref 35–48)
HGB BLD-MCNC: 10.8 G/DL (ref 12–16)
IMM GRANULOCYTES # BLD AUTO: 0.02 X10(3) UL (ref 0–1)
IMM GRANULOCYTES NFR BLD: 0.2 %
LYMPHOCYTES # BLD AUTO: 1.65 X10(3) UL (ref 1–4)
LYMPHOCYTES NFR BLD AUTO: 18.5 %
MCH RBC QN AUTO: 24.8 PG (ref 26–34)
MCHC RBC AUTO-ENTMCNC: 30.3 G/DL (ref 31–37)
MCV RBC AUTO: 81.7 FL (ref 80–100)
MONOCYTES # BLD AUTO: 0.63 X10(3) UL (ref 0.1–1)
MONOCYTES NFR BLD AUTO: 7.1 %
NEUTROPHILS # BLD AUTO: 5.66 X10 (3) UL (ref 1.5–7.7)
NEUTROPHILS # BLD AUTO: 5.66 X10(3) UL (ref 1.5–7.7)
NEUTROPHILS NFR BLD AUTO: 63.4 %
PLATELET # BLD AUTO: 268 10(3)UL (ref 150–450)
RBC # BLD AUTO: 4.36 X10(6)UL (ref 3.8–5.3)
TSI SER-ACNC: 1.31 MIU/ML (ref 0.36–3.74)
WBC # BLD AUTO: 8.9 X10(3) UL (ref 4–11)

## 2020-02-17 PROCEDURE — 84443 ASSAY THYROID STIM HORMONE: CPT

## 2020-02-17 PROCEDURE — 82947 ASSAY GLUCOSE BLOOD QUANT: CPT

## 2020-02-17 PROCEDURE — 85025 COMPLETE CBC W/AUTO DIFF WBC: CPT

## 2020-02-17 PROCEDURE — 99386 PREV VISIT NEW AGE 40-64: CPT | Performed by: OBSTETRICS & GYNECOLOGY

## 2020-02-17 PROCEDURE — 36415 COLL VENOUS BLD VENIPUNCTURE: CPT

## 2020-02-17 PROCEDURE — 83001 ASSAY OF GONADOTROPIN (FSH): CPT

## 2020-02-17 PROCEDURE — 83036 HEMOGLOBIN GLYCOSYLATED A1C: CPT

## 2020-02-17 RX ORDER — CLOTRIMAZOLE AND BETAMETHASONE DIPROPIONATE 10; .64 MG/G; MG/G
1 CREAM TOPICAL 2 TIMES DAILY
Qty: 45 G | Refills: 0 | Status: SHIPPED | OUTPATIENT
Start: 2020-02-17 | End: 2021-02-16

## 2020-02-17 RX ORDER — FLUCONAZOLE 150 MG/1
TABLET ORAL
Qty: 2 TABLET | Refills: 1 | Status: SHIPPED | OUTPATIENT
Start: 2020-02-17 | End: 2021-04-01

## 2020-02-17 NOTE — PROGRESS NOTES
[facility]    [address] [facility] [address]    Phone:  [phone]    Fax:  [phone]       Thank You for choosing us for your health care visit. We are glad to serve you and happy to provide you with this summary of your visit. Please help us to ensure we have accurate records. If you find anything that needs to be changed, please let our staff know as soon as possible.          Your Demographic Information     Patient Name Sex Dg Reece Male 1987       Ethnic Group Patient Race    / Origin White      Your Visit Details     Date & Time Provider Department    2017 1:15 PM Dung Rivera MD [facility]      Your Upcoming Appointment*(Max 10)     2017  1:30 PM CDT   Post-Op Visit with Dung Rivera MD   [facility] ([facility])    [address] [facility] [address] [phone]            2018  1:30 PM CDT   Follow-up Visit with Chris Hughes MD   [facility] ([facility])    [address] [facility] [address] [phone]              Conditions Discussed Today or Order-Related Diagnoses        Comments    Umbilical hernia without obstruction and without gangrene    -  Primary       Your Vitals Were     BP Pulse Height Weight BMI Smoking Status    117/69 (BP Location: [facility], Patient Position: Sitting, Cuff Size: Regular) 57 5' 7\" (1.702 m) 158 lb (71.7 kg) 24.75 kg/m2 Never Smoker      Medications Prescribed or Re-Ordered Today     None      Your Current Medications Are        Disp Refills Start End    ZINC SULFATE PO        Sig - Route: Take by mouth daily. - Oral    Class: Historical Med    Fish Oil-Cholecalciferol (FISH OIL + D3 PO)        Sig - Route: Take by mouth daily. - Oral    Class: Historical Med    VITAMIN E PO        Sig - Route: Take by mouth  HPI:    Patient ID: Katie Cotto is a 38year old year old female. HPI  New patient  Well woman visit  42-year-old  female  4 para 2-0-2-2 brings with her complaints of prior yeast infection.   She feels like she has 1 a daily. - Oral    Class: Historical Med      Allergies     Seasonal Other (See Comments)    Itching watery eyes      Problem List as of 6/7/2017     Cardiomyopathy (CMS/HCC)              Patient Instructions    In the next few weeks you may receive a Press Ganey survey regarding your most recent clinic visit with us.  Please take a few moments out of your day to accurately evaluate your visit.  We strive to provide you with the best medical care.  Again, thank you for your time and we look forward to your next visit.    If we need to contact you regarding any test results, we will make 2 attempts to reach you at the number you have listed during your office visit today. If we are unable to reach you, a letter with your results and any further instructions will be mailed to you home.    Please do not hesitate to call our office with any questions or concerns   [phone]  Patient was given For Your Well Being patient instruction of CHG Soap - [id number] [id number].          not taking: Reported on 2/17/2020 ) 30 tablet 0       Physical Exam    Vitals: /70   Pulse 84   Wt 165 lb 6.4 oz (75 kg)   LMP 02/02/2020 (Exact Date)   Breastfeeding No   BMI 27.52 kg/m²   Neck supple no masses  Breasts normal, no masses.   Nipples a and quality diet.   Return to clinic after menses      (Z12.31) Encounter for screening mammogram for breast cancer  Plan: [facility] 2D+3D SCREENING BILAT         (CPT=77067/58114)            (N89.8) Vaginal discharge  Plan: GENITAL VAGINOSIS SCREEN, GENITAL Application topically 2 (two) times daily.           Dispense:  45 g          Refill:  0      fluconazole (DIFLUCAN) 150 MG Oral Tab, Take one tablet by mouth and second tablet three days later, Disp: 2 tablet, Rfl: 1  clotrimazole-betamethasone (LOTRISONE)

## 2020-02-18 LAB — HPV I/H RISK 1 DNA SPEC QL NAA+PROBE: NEGATIVE

## 2020-02-19 ENCOUNTER — TELEPHONE (OUTPATIENT)
Dept: OBGYN CLINIC | Facility: CLINIC | Age: 45
End: 2020-02-19

## 2020-02-19 LAB
GENITAL VAGINOSIS SCREEN: NEGATIVE
TRICHOMONAS SCREEN: NEGATIVE

## 2020-02-19 NOTE — TELEPHONE ENCOUNTER
----- Message from Nataliia Herr MD sent at 2/19/2020 11:11 AM CST -----  BV negative on vag culture    Notes recorded by Paramjit Baig MD on 2/19/2020 at 9:26 AM CST  Please inform that testing shows patient has a vaginal yeast infection.  It should

## 2020-03-06 ENCOUNTER — OFFICE VISIT (OUTPATIENT)
Dept: OBGYN CLINIC | Facility: CLINIC | Age: 45
End: 2020-03-06

## 2020-03-06 DIAGNOSIS — N92.0 MENORRHAGIA WITH REGULAR CYCLE: Primary | ICD-10-CM

## 2020-03-06 DIAGNOSIS — D50.0 IRON DEFICIENCY ANEMIA DUE TO CHRONIC BLOOD LOSS: ICD-10-CM

## 2020-03-06 DIAGNOSIS — D25.1 INTRAMURAL UTERINE FIBROID: ICD-10-CM

## 2020-03-06 PROCEDURE — 99213 OFFICE O/P EST LOW 20 MIN: CPT | Performed by: OBSTETRICS & GYNECOLOGY

## 2020-03-06 PROCEDURE — 76830 TRANSVAGINAL US NON-OB: CPT | Performed by: OBSTETRICS & GYNECOLOGY

## 2020-03-06 NOTE — PROGRESS NOTES
HPI:    Patient ID: Anitha James is a 38year old female.     HPI  GYN problem follow-up  Transvaginal pelvic ultrasound shows a 2.5 x 2 cm posterior intramural fibroid compressing and displacing the uterine cavity and endometrium anterio Adult Allergy Food             Profile  Penicillins             SWELLING   PHYSICAL EXAM:   Physical Exam           ASSESSMENT/PLAN:   Menorrhagia with regular cycle  (primary encounter diagnosis)    No orders of the defined types were placed in this encou

## 2020-08-27 ENCOUNTER — TELEPHONE (OUTPATIENT)
Dept: INTERNAL MEDICINE CLINIC | Facility: CLINIC | Age: 45
End: 2020-08-27

## 2020-08-27 ENCOUNTER — TELEMEDICINE (OUTPATIENT)
Dept: FAMILY MEDICINE CLINIC | Facility: CLINIC | Age: 45
End: 2020-08-27

## 2020-08-27 ENCOUNTER — NURSE TRIAGE (OUTPATIENT)
Dept: FAMILY MEDICINE CLINIC | Facility: CLINIC | Age: 45
End: 2020-08-27

## 2020-08-27 DIAGNOSIS — Z20.822 ENCOUNTER BY TELEHEALTH FOR SUSPECTED COVID-19: Primary | ICD-10-CM

## 2020-08-27 PROCEDURE — 99213 OFFICE O/P EST LOW 20 MIN: CPT | Performed by: NURSE PRACTITIONER

## 2020-08-27 NOTE — TELEPHONE ENCOUNTER
Please direct patient to [facility] or [facility] for testing.

## 2020-08-27 NOTE — TELEPHONE ENCOUNTER
Not our patient. Directed her to Dr. Trey Rios at [facility].

## 2020-08-27 NOTE — PROGRESS NOTES
[facility]    Virtual Telephone/Video Doximity Check-In    [id number] verbally consents to a Virtual/Telephone Check-In visit on 08/27/20.   Patient has been referred to the [facility] website at [email or web address] to review the yearly Consent file      Number of children: Not on file      Years of education: Not on file      Highest education level: Not on file    Occupational History      Not on file    Social Needs      Financial resource strain: Not on file      Food insecurity:        Worry Oral Tab Take 1 tablet (1 mg total) by mouth nightly. (Patient not taking: Reported on 2/17/2020 ) 30 tablet 0   • SUMAtriptan Succinate 50 MG Oral Tab TAKE 1 TAB EVERY 1-2 HOURS FOR HEADACHE MAXIMUM 4 TABS IN 24 HOURS.  (Patient not taking: Reported on 3/6

## 2020-08-27 NOTE — TELEPHONE ENCOUNTER
Action Requested: Summary for Provider     []  Critical Lab, Recommendations Needed  [] Need Additional Advice  []   FYI    []   Need Orders  [] Need Medications Sent to Pharmacy  []  Other     SUMMARY: Patient reports concerns of exposure to Covid, report

## 2020-08-27 NOTE — TELEPHONE ENCOUNTER
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 Her employer is asking for a Covid test be run. She is asking what symptoms to watch for.

## 2020-08-28 ENCOUNTER — APPOINTMENT (OUTPATIENT)
Dept: LAB | Age: 45
End: 2020-08-28
Attending: NURSE PRACTITIONER
Payer: COMMERCIAL

## 2020-08-28 DIAGNOSIS — Z20.822 ENCOUNTER BY TELEHEALTH FOR SUSPECTED COVID-19: ICD-10-CM

## 2020-08-29 LAB — SARS-COV-2 RNA RESP QL NAA+PROBE: NOT DETECTED

## 2020-11-17 ENCOUNTER — TELEPHONE (OUTPATIENT)
Dept: OBGYN CLINIC | Facility: CLINIC | Age: 45
End: 2020-11-17

## 2020-11-17 NOTE — TELEPHONE ENCOUNTER
Regarding: RE: Prescription Question  Contact: [phone]  ----- Message from Ginny Solares sent at 11/17/2020  9:24 AM CST -----       ----- Message sent from Caryle Emperor, RN to Sussy Fuchs at 11/16/2020  1:25 PM -----   Chris Dowling

## 2021-04-01 ENCOUNTER — OFFICE VISIT (OUTPATIENT)
Dept: FAMILY MEDICINE CLINIC | Facility: CLINIC | Age: 46
End: 2021-04-01

## 2021-04-01 VITALS
HEART RATE: 63 BPM | DIASTOLIC BLOOD PRESSURE: 79 MMHG | SYSTOLIC BLOOD PRESSURE: 124 MMHG | WEIGHT: 160 LBS | BODY MASS INDEX: 26.66 KG/M2 | HEIGHT: 65 IN

## 2021-04-01 DIAGNOSIS — K52.9 GASTROENTERITIS: Primary | ICD-10-CM

## 2021-04-01 PROCEDURE — 3074F SYST BP LT 130 MM HG: CPT | Performed by: FAMILY MEDICINE

## 2021-04-01 PROCEDURE — 3078F DIAST BP <80 MM HG: CPT | Performed by: FAMILY MEDICINE

## 2021-04-01 PROCEDURE — 99213 OFFICE O/P EST LOW 20 MIN: CPT | Performed by: FAMILY MEDICINE

## 2021-04-01 PROCEDURE — 3008F BODY MASS INDEX DOCD: CPT | Performed by: FAMILY MEDICINE

## 2021-04-01 RX ORDER — SULFAMETHOXAZOLE AND TRIMETHOPRIM 800; 160 MG/1; MG/1
1 TABLET ORAL 2 TIMES DAILY
Qty: 14 TABLET | Refills: 0 | Status: SHIPPED | OUTPATIENT
Start: 2021-04-01 | End: 2021-04-08

## 2021-04-01 NOTE — PROGRESS NOTES
4/1/2021  3:17 PM    Calin Tomlinson is a 54year old female.     Chief complaint(s): Patient presents with:  Diarrhea: c/o loose watery stools and nausea , x 8 days, denies abdominal pain,     HPI:     Sussy Lemus 14 tablet 0       Allergies:    Shrimp                  HIVES, Tightness in Throat    Comment:Confirmed allergy with 10/8/18 Adult Allergy Food             Profile  Penicillins             SWELLING      ROS:   Review of Systems   Constitutional: Negative f Schedule a follow-up visit in  prn.            Orders This Visit:  Orders Placed This Encounter      Stool Culture [E]      Stool Ova and Parasites (non-traveler) [E]      Meds This Visit:  Requested Prescriptions     Signed Prescriptions Disp Refills   • S

## 2021-04-02 ENCOUNTER — LAB ENCOUNTER (OUTPATIENT)
Dept: LAB | Age: 46
End: 2021-04-02
Attending: FAMILY MEDICINE
Payer: COMMERCIAL

## 2021-04-02 DIAGNOSIS — K52.9 GASTROENTERITIS: ICD-10-CM

## 2021-04-02 PROCEDURE — 87329 GIARDIA AG IA: CPT

## 2021-04-02 PROCEDURE — 87272 CRYPTOSPORIDIUM AG IF: CPT

## 2021-04-02 PROCEDURE — 87427 SHIGA-LIKE TOXIN AG IA: CPT

## 2021-04-02 PROCEDURE — 87045 FECES CULTURE AEROBIC BACT: CPT

## 2021-04-02 PROCEDURE — 87046 STOOL CULTR AEROBIC BACT EA: CPT

## 2021-11-30 ENCOUNTER — HOSPITAL ENCOUNTER (OUTPATIENT)
Age: 46
Discharge: HOME | End: 2021-11-30
Payer: COMMERCIAL

## 2021-11-30 VITALS
SYSTOLIC BLOOD PRESSURE: 149 MMHG | WEIGHT: 170 LBS | RESPIRATION RATE: 18 BRPM | HEART RATE: 83 BPM | OXYGEN SATURATION: 100 % | BODY MASS INDEX: 28.32 KG/M2 | TEMPERATURE: 98 F | HEIGHT: 65 IN | DIASTOLIC BLOOD PRESSURE: 97 MMHG

## 2021-11-30 DIAGNOSIS — G44.209 TENSION HEADACHE: Primary | ICD-10-CM

## 2021-11-30 DIAGNOSIS — B00.1 COLD SORE: ICD-10-CM

## 2021-11-30 PROCEDURE — 99213 OFFICE O/P EST LOW 20 MIN: CPT | Performed by: NURSE PRACTITIONER

## 2021-11-30 RX ORDER — IBUPROFEN 600 MG/1
600 TABLET ORAL ONCE
Status: COMPLETED | OUTPATIENT
Start: 2021-11-30 | End: 2021-11-30

## 2021-11-30 RX ORDER — ACETAMINOPHEN 325 MG/1
650 TABLET ORAL ONCE
Status: COMPLETED | OUTPATIENT
Start: 2021-11-30 | End: 2021-11-30

## 2021-11-30 RX ORDER — BUTALBITAL, ACETAMINOPHEN AND CAFFEINE 50; 325; 40 MG/1; MG/1; MG/1
1 TABLET ORAL EVERY 6 HOURS PRN
Qty: 10 TABLET | Refills: 0 | Status: SHIPPED | OUTPATIENT
Start: 2021-11-30 | End: 2021-12-07

## 2021-11-30 RX ORDER — VALACYCLOVIR HYDROCHLORIDE 500 MG/1
500 TABLET, FILM COATED ORAL 2 TIMES DAILY
Qty: 6 TABLET | Refills: 0 | Status: SHIPPED | OUTPATIENT
Start: 2021-11-30 | End: 2021-12-03

## 2021-11-30 NOTE — ED INITIAL ASSESSMENT (HPI)
Pt has had a headache since Friday afternoon after her COVID booster shot - experiencing tiredness too

## 2021-11-30 NOTE — ED PROVIDER NOTES
Patient Seen in: [facility]      History   Patient presents with:  Headache: Covid booster shot Friday afternoon    Stated Complaint: Headache,Fever after booster    Subjective:   Patient presents to the immediate care accompanied by self.   P wound.   Neurological: Positive for headaches. Negative for dizziness and light-headedness. Positive for stated complaint: Headache,Fever after booster  Other systems are as noted in HPI. Constitutional and vital signs reviewed.       All other syste motion. Cervical back: Normal range of motion and neck supple. Skin:     General: Skin is warm and dry. Capillary Refill: Capillary refill takes less than 2 seconds. Coloration: Skin is not pale.    Neurological:      General: No focal defi started on Valtrex and instructed to take Fioricet as needed for severe pain. Lengthy discussion regarding medication regimen. Strict ED return precautions given. Discussed plan of care and agrees.   Patient directed to follow-up with primary care physic

## 2022-01-17 ENCOUNTER — OFFICE VISIT (OUTPATIENT)
Dept: FAMILY MEDICINE CLINIC | Facility: CLINIC | Age: 47
End: 2022-01-17
Payer: COMMERCIAL

## 2022-01-17 ENCOUNTER — OFFICE VISIT (OUTPATIENT)
Dept: OBGYN CLINIC | Facility: CLINIC | Age: 47
End: 2022-01-17
Payer: COMMERCIAL

## 2022-01-17 ENCOUNTER — LAB ENCOUNTER (OUTPATIENT)
Dept: LAB | Age: 47
End: 2022-01-17
Attending: FAMILY MEDICINE
Payer: COMMERCIAL

## 2022-01-17 VITALS — WEIGHT: 167 LBS | BODY MASS INDEX: 27.82 KG/M2 | HEIGHT: 65 IN

## 2022-01-17 VITALS — DIASTOLIC BLOOD PRESSURE: 82 MMHG | BODY MASS INDEX: 28 KG/M2 | WEIGHT: 167 LBS | SYSTOLIC BLOOD PRESSURE: 134 MMHG

## 2022-01-17 DIAGNOSIS — K21.9 GASTROESOPHAGEAL REFLUX DISEASE WITHOUT ESOPHAGITIS: ICD-10-CM

## 2022-01-17 DIAGNOSIS — N95.1 PERIMENOPAUSE: ICD-10-CM

## 2022-01-17 DIAGNOSIS — I83.813 VARICOSE VEINS OF BOTH LOWER EXTREMITIES WITH PAIN: ICD-10-CM

## 2022-01-17 DIAGNOSIS — Z00.00 PHYSICAL EXAM: ICD-10-CM

## 2022-01-17 DIAGNOSIS — R23.2 HOT FLASHES: ICD-10-CM

## 2022-01-17 DIAGNOSIS — Z00.00 PHYSICAL EXAM: Primary | ICD-10-CM

## 2022-01-17 DIAGNOSIS — Z12.31 SCREENING MAMMOGRAM, ENCOUNTER FOR: ICD-10-CM

## 2022-01-17 DIAGNOSIS — Z01.419 ENCOUNTER FOR WELL WOMAN EXAM WITH ROUTINE GYNECOLOGICAL EXAM: Primary | ICD-10-CM

## 2022-01-17 LAB
BASOPHILS # BLD AUTO: 0.06 X10(3) UL (ref 0–0.2)
BASOPHILS NFR BLD AUTO: 0.7 %
BILIRUB UR QL: NEGATIVE
COLOR UR: YELLOW
DEPRECATED RDW RBC AUTO: 45.8 FL (ref 35.1–46.3)
EOSINOPHIL # BLD AUTO: 0.64 X10(3) UL (ref 0–0.7)
EOSINOPHIL NFR BLD AUTO: 7.2 %
ERYTHROCYTE [DISTWIDTH] IN BLOOD BY AUTOMATED COUNT: 16.2 % (ref 11–15)
FSH SERPL-ACNC: 1.6 MIU/ML
GLUCOSE UR-MCNC: NEGATIVE MG/DL
HCT VFR BLD AUTO: 32.6 %
HGB BLD-MCNC: 9.4 G/DL
HGB UR QL STRIP.AUTO: NEGATIVE
IMM GRANULOCYTES # BLD AUTO: 0.02 X10(3) UL (ref 0–1)
IMM GRANULOCYTES NFR BLD: 0.2 %
KETONES UR-MCNC: NEGATIVE MG/DL
LEUKOCYTE ESTERASE UR QL STRIP.AUTO: NEGATIVE
LYMPHOCYTES # BLD AUTO: 1.45 X10(3) UL (ref 1–4)
LYMPHOCYTES NFR BLD AUTO: 16.4 %
MCH RBC QN AUTO: 22.5 PG (ref 26–34)
MCHC RBC AUTO-ENTMCNC: 28.8 G/DL (ref 31–37)
MCV RBC AUTO: 78.2 FL
MONOCYTES # BLD AUTO: 0.62 X10(3) UL (ref 0.1–1)
MONOCYTES NFR BLD AUTO: 7 %
NEUTROPHILS # BLD AUTO: 6.06 X10 (3) UL (ref 1.5–7.7)
NEUTROPHILS # BLD AUTO: 6.06 X10(3) UL (ref 1.5–7.7)
NEUTROPHILS NFR BLD AUTO: 68.5 %
NITRITE UR QL STRIP.AUTO: NEGATIVE
PH UR: 5 [PH] (ref 5–8)
PLATELET # BLD AUTO: 306 10(3)UL (ref 150–450)
PROT UR-MCNC: NEGATIVE MG/DL
RBC # BLD AUTO: 4.17 X10(6)UL
SP GR UR STRIP: 1.02 (ref 1–1.03)
TSI SER-ACNC: 1.64 MIU/ML (ref 0.36–3.74)
UROBILINOGEN UR STRIP-ACNC: 2
VIT D+METAB SERPL-MCNC: 11.7 NG/ML (ref 30–100)
WBC # BLD AUTO: 8.9 X10(3) UL (ref 4–11)

## 2022-01-17 PROCEDURE — 99396 PREV VISIT EST AGE 40-64: CPT | Performed by: FAMILY MEDICINE

## 2022-01-17 PROCEDURE — 36415 COLL VENOUS BLD VENIPUNCTURE: CPT

## 2022-01-17 PROCEDURE — 81001 URINALYSIS AUTO W/SCOPE: CPT

## 2022-01-17 PROCEDURE — 99396 PREV VISIT EST AGE 40-64: CPT | Performed by: OBSTETRICS & GYNECOLOGY

## 2022-01-17 PROCEDURE — 3008F BODY MASS INDEX DOCD: CPT | Performed by: FAMILY MEDICINE

## 2022-01-17 PROCEDURE — 99213 OFFICE O/P EST LOW 20 MIN: CPT | Performed by: FAMILY MEDICINE

## 2022-01-17 PROCEDURE — 84443 ASSAY THYROID STIM HORMONE: CPT

## 2022-01-17 PROCEDURE — 83013 H PYLORI (C-13) BREATH: CPT

## 2022-01-17 PROCEDURE — 3075F SYST BP GE 130 - 139MM HG: CPT | Performed by: OBSTETRICS & GYNECOLOGY

## 2022-01-17 PROCEDURE — 85025 COMPLETE CBC W/AUTO DIFF WBC: CPT

## 2022-01-17 PROCEDURE — 3079F DIAST BP 80-89 MM HG: CPT | Performed by: OBSTETRICS & GYNECOLOGY

## 2022-01-17 PROCEDURE — 83001 ASSAY OF GONADOTROPIN (FSH): CPT

## 2022-01-17 PROCEDURE — 82306 VITAMIN D 25 HYDROXY: CPT

## 2022-01-17 NOTE — PROGRESS NOTES
1/17/2022  1:51 PM    Ree Nieto is a 51year old female. Chief complaint(s): Patient presents with:  Routine Physical: Adult exam    HPI:     Ree Nieto primary complaint is regarding CPE.      Carlos Mason • COLONOSCOPY N/A 9/8/2017    Procedure: COLONOSCOPY;  Surgeon: Jie Garcia MD;  Location: [address] [place] [address] ENDOSCOPY      Family History   Problem Relation Age of Onset   • Other (Other) Father    • Diabetes Mother    • Hypertension Mother    • Juancarlos 27.79 kg/m²     Physical Exam  Vitals reviewed. Constitutional:       Appearance: She is well-developed. HENT:      Head: Normocephalic.       Right Ear: Hearing, tympanic membrane, ear canal and external ear normal.      Left Ear: Hearing, tympanic mem lower extremities with pain  Gastroesophageal reflux disease without esophagitis    1.  Physical exam  Assessment and Plan:     [facility] checkup as follows:    LABORATORY & ORDERS: Orders Placed This Encounter      CBC With Dif Gastroesophageal reflux disease without esophagitis       LABS & ORDERS: Orders Placed This Encounter         Helicobacter Pylori Breath Test, Adult    RECOMMENDATIONS given include: PUD Diet Instructions: Avoid any citric juice; lemonade, orange juice, gr

## 2022-01-17 NOTE — PROGRESS NOTES
HPI:    Patient ID: Adi Hauser is a 51year old year old female. HPI  Well woman visit  57-year-old  4 para 2-0-2-2 last menstrual period  menstrual cycles are alternating lasting 4 days or 8 days.   Sometimes heavy discharge. Urethra- normal without lesion, cyst, mass, or tenderness. Vulva- normal.  Labia majora and minora without lesions. Vagina- normal, no lesions or discharge. Moist and well supported. Bladder-  nontender. No masses. Normal support.   No ev

## 2022-01-18 ENCOUNTER — TELEPHONE (OUTPATIENT)
Dept: OBGYN CLINIC | Facility: CLINIC | Age: 47
End: 2022-01-18

## 2022-01-18 RX ORDER — ERGOCALCIFEROL 1.25 MG/1
50000 CAPSULE ORAL WEEKLY
Qty: 12 CAPSULE | Refills: 4 | Status: SHIPPED | OUTPATIENT
Start: 2022-01-18 | End: 2022-02-17

## 2022-01-18 NOTE — TELEPHONE ENCOUNTER
BriefMe message sent to pt.      ----- Message from Christina Gonzalez MD sent at 1/18/2022  1:44 PM CST -----  Please inform that her blood test shows she is NOT close to menopause at all.

## 2022-01-19 LAB
H. PYLORI BREATH TEST: NEGATIVE
HPV I/H RISK 1 DNA SPEC QL NAA+PROBE: NEGATIVE

## 2022-01-20 ENCOUNTER — EKG ENCOUNTER (OUTPATIENT)
Dept: LAB | Age: 47
End: 2022-01-20
Attending: FAMILY MEDICINE
Payer: COMMERCIAL

## 2022-01-20 ENCOUNTER — LAB ENCOUNTER (OUTPATIENT)
Dept: LAB | Age: 47
End: 2022-01-20
Attending: FAMILY MEDICINE
Payer: COMMERCIAL

## 2022-01-20 DIAGNOSIS — Z00.00 PHYSICAL EXAM: ICD-10-CM

## 2022-01-20 LAB
ALBUMIN SERPL-MCNC: 3.5 G/DL (ref 3.4–5)
ALBUMIN/GLOB SERPL: 0.9 {RATIO} (ref 1–2)
ALP LIVER SERPL-CCNC: 95 U/L
ALT SERPL-CCNC: 25 U/L
ANION GAP SERPL CALC-SCNC: 2 MMOL/L (ref 0–18)
AST SERPL-CCNC: 14 U/L (ref 15–37)
BILIRUB SERPL-MCNC: 0.3 MG/DL (ref 0.1–2)
BUN BLD-MCNC: 7 MG/DL (ref 7–18)
BUN/CREAT SERPL: 11.3 (ref 10–20)
CALCIUM BLD-MCNC: 8.3 MG/DL (ref 8.5–10.1)
CHLORIDE SERPL-SCNC: 111 MMOL/L (ref 98–112)
CHOLEST SERPL-MCNC: 114 MG/DL (ref ?–200)
CO2 SERPL-SCNC: 26 MMOL/L (ref 21–32)
CREAT BLD-MCNC: 0.62 MG/DL
EST. AVERAGE GLUCOSE BLD GHB EST-MCNC: 126 MG/DL (ref 68–126)
FASTING PATIENT LIPID ANSWER: YES
FASTING STATUS PATIENT QL REPORTED: YES
GLOBULIN PLAS-MCNC: 3.7 G/DL (ref 2.8–4.4)
GLUCOSE BLD-MCNC: 101 MG/DL (ref 70–99)
HBA1C MFR BLD: 6 % (ref ?–5.7)
HDLC SERPL-MCNC: 41 MG/DL (ref 40–59)
LDLC SERPL CALC-MCNC: 58 MG/DL (ref ?–100)
NONHDLC SERPL-MCNC: 73 MG/DL (ref ?–130)
OSMOLALITY SERPL CALC.SUM OF ELEC: 286 MOSM/KG (ref 275–295)
POTASSIUM SERPL-SCNC: 4.2 MMOL/L (ref 3.5–5.1)
PROT SERPL-MCNC: 7.2 G/DL (ref 6.4–8.2)
SODIUM SERPL-SCNC: 139 MMOL/L (ref 136–145)
TRIGL SERPL-MCNC: 75 MG/DL (ref 30–149)
VLDLC SERPL CALC-MCNC: 11 MG/DL (ref 0–30)

## 2022-01-20 PROCEDURE — 83036 HEMOGLOBIN GLYCOSYLATED A1C: CPT

## 2022-01-20 PROCEDURE — 36415 COLL VENOUS BLD VENIPUNCTURE: CPT

## 2022-01-20 PROCEDURE — 80061 LIPID PANEL: CPT

## 2022-01-20 PROCEDURE — 93005 ELECTROCARDIOGRAM TRACING: CPT

## 2022-01-20 PROCEDURE — 93010 ELECTROCARDIOGRAM REPORT: CPT | Performed by: FAMILY MEDICINE

## 2022-01-20 PROCEDURE — 80053 COMPREHEN METABOLIC PANEL: CPT

## 2022-10-20 ENCOUNTER — OFFICE VISIT (OUTPATIENT)
Dept: FAMILY MEDICINE CLINIC | Facility: CLINIC | Age: 47
End: 2022-10-20
Payer: COMMERCIAL

## 2022-10-20 VITALS
WEIGHT: 164 LBS | BODY MASS INDEX: 27.32 KG/M2 | HEART RATE: 76 BPM | HEIGHT: 65 IN | SYSTOLIC BLOOD PRESSURE: 128 MMHG | DIASTOLIC BLOOD PRESSURE: 83 MMHG

## 2022-10-20 DIAGNOSIS — Z30.013 ENCOUNTER FOR INITIAL PRESCRIPTION OF INJECTABLE CONTRACEPTIVE: ICD-10-CM

## 2022-10-20 DIAGNOSIS — D50.9 IRON DEFICIENCY ANEMIA, UNSPECIFIED IRON DEFICIENCY ANEMIA TYPE: ICD-10-CM

## 2022-10-20 DIAGNOSIS — R10.12 LUQ PAIN: Primary | ICD-10-CM

## 2022-10-20 LAB
CUVETTE LOT #: ABNORMAL NUMERIC
HEMOGLOBIN: 10.5 G/DL (ref 12–15)

## 2022-10-20 PROCEDURE — 85018 HEMOGLOBIN: CPT | Performed by: FAMILY MEDICINE

## 2022-10-20 PROCEDURE — 3079F DIAST BP 80-89 MM HG: CPT | Performed by: FAMILY MEDICINE

## 2022-10-20 PROCEDURE — 99214 OFFICE O/P EST MOD 30 MIN: CPT | Performed by: FAMILY MEDICINE

## 2022-10-20 PROCEDURE — 3008F BODY MASS INDEX DOCD: CPT | Performed by: FAMILY MEDICINE

## 2022-10-20 PROCEDURE — 90471 IMMUNIZATION ADMIN: CPT | Performed by: FAMILY MEDICINE

## 2022-10-20 PROCEDURE — 3074F SYST BP LT 130 MM HG: CPT | Performed by: FAMILY MEDICINE

## 2022-10-20 PROCEDURE — 96372 THER/PROPH/DIAG INJ SC/IM: CPT | Performed by: FAMILY MEDICINE

## 2022-10-20 PROCEDURE — 90686 IIV4 VACC NO PRSV 0.5 ML IM: CPT | Performed by: FAMILY MEDICINE

## 2022-10-20 RX ORDER — FOLIC ACID 1 MG/1
1 TABLET ORAL DAILY
Qty: 90 TABLET | Refills: 1 | Status: SHIPPED | OUTPATIENT
Start: 2022-10-20

## 2022-10-20 RX ORDER — ERGOCALCIFEROL 1.25 MG/1
CAPSULE ORAL
COMMUNITY
Start: 2022-09-19

## 2022-10-20 RX ORDER — MEDROXYPROGESTERONE ACETATE 150 MG/ML
150 INJECTION, SUSPENSION INTRAMUSCULAR ONCE
Status: COMPLETED | OUTPATIENT
Start: 2022-10-20 | End: 2022-10-20

## 2022-10-20 RX ORDER — FERROUS SULFATE 325(65) MG
325 TABLET ORAL 2 TIMES DAILY
Qty: 60 TABLET | Refills: 3 | Status: SHIPPED | OUTPATIENT
Start: 2022-10-20

## 2022-10-20 RX ORDER — FAMOTIDINE 40 MG/1
40 TABLET, FILM COATED ORAL DAILY
Qty: 60 TABLET | Refills: 0 | Status: SHIPPED | OUTPATIENT
Start: 2022-10-20

## 2022-10-20 RX ADMIN — MEDROXYPROGESTERONE ACETATE 150 MG: 150 INJECTION, SUSPENSION INTRAMUSCULAR at 14:59:00

## 2022-11-03 ENCOUNTER — HOSPITAL ENCOUNTER (OUTPATIENT)
Dept: MAMMOGRAPHY | Facility: HOSPITAL | Age: 47
Discharge: HOME | End: 2022-11-03
Attending: OBSTETRICS & GYNECOLOGY
Payer: COMMERCIAL

## 2022-11-03 DIAGNOSIS — Z12.31 SCREENING MAMMOGRAM, ENCOUNTER FOR: ICD-10-CM

## 2022-11-03 PROCEDURE — 77063 BREAST TOMOSYNTHESIS BI: CPT | Performed by: OBSTETRICS & GYNECOLOGY

## 2022-11-03 PROCEDURE — 77067 SCR MAMMO BI INCL CAD: CPT | Performed by: OBSTETRICS & GYNECOLOGY

## 2022-11-04 ENCOUNTER — TELEPHONE (OUTPATIENT)
Dept: OBGYN CLINIC | Facility: CLINIC | Age: 47
End: 2022-11-04

## 2022-11-23 ENCOUNTER — HOSPITAL ENCOUNTER (OUTPATIENT)
Dept: ULTRASOUND IMAGING | Facility: HOSPITAL | Age: 47
Discharge: HOME | End: 2022-11-23
Attending: FAMILY MEDICINE
Payer: COMMERCIAL

## 2022-11-23 DIAGNOSIS — R10.12 LUQ PAIN: ICD-10-CM

## 2022-11-23 PROCEDURE — 76700 US EXAM ABDOM COMPLETE: CPT | Performed by: FAMILY MEDICINE

## 2023-01-23 ENCOUNTER — OFFICE VISIT (OUTPATIENT)
Dept: FAMILY MEDICINE CLINIC | Facility: CLINIC | Age: 48
End: 2023-01-23

## 2023-01-23 VITALS
BODY MASS INDEX: 27.52 KG/M2 | HEART RATE: 76 BPM | DIASTOLIC BLOOD PRESSURE: 86 MMHG | SYSTOLIC BLOOD PRESSURE: 129 MMHG | HEIGHT: 65 IN | WEIGHT: 165.19 LBS

## 2023-01-23 DIAGNOSIS — D50.9 IRON DEFICIENCY ANEMIA, UNSPECIFIED IRON DEFICIENCY ANEMIA TYPE: ICD-10-CM

## 2023-01-23 DIAGNOSIS — S39.012A STRAIN OF LUMBAR REGION, INITIAL ENCOUNTER: ICD-10-CM

## 2023-01-23 DIAGNOSIS — Z30.42 ENCOUNTER FOR SURVEILLANCE OF INJECTABLE CONTRACEPTIVE: Primary | ICD-10-CM

## 2023-01-23 LAB
CUVETTE EXPIRATION DATE: ABNORMAL DATE
CUVETTE LOT #: ABNORMAL NUMERIC
HEMOGLOBIN: 10.8 G/DL (ref 13–17)

## 2023-01-23 RX ORDER — MEDROXYPROGESTERONE ACETATE 150 MG/ML
150 INJECTION, SUSPENSION INTRAMUSCULAR ONCE
Status: COMPLETED | OUTPATIENT
Start: 2023-01-23 | End: 2023-01-23

## 2023-01-23 RX ORDER — CYCLOBENZAPRINE HCL 5 MG
5 TABLET ORAL 3 TIMES DAILY PRN
Qty: 30 TABLET | Refills: 0 | Status: SHIPPED | OUTPATIENT
Start: 2023-01-23

## 2023-01-23 RX ADMIN — MEDROXYPROGESTERONE ACETATE 150 MG: 150 INJECTION, SUSPENSION INTRAMUSCULAR at 17:00:00

## 2023-04-14 ENCOUNTER — TELEPHONE (OUTPATIENT)
Dept: FAMILY MEDICINE CLINIC | Facility: CLINIC | Age: 48
End: 2023-04-14

## 2023-04-14 ENCOUNTER — NURSE ONLY (OUTPATIENT)
Dept: FAMILY MEDICINE CLINIC | Facility: CLINIC | Age: 48
End: 2023-04-14

## 2023-04-14 DIAGNOSIS — Z30.42 ENCOUNTER FOR SURVEILLANCE OF INJECTABLE CONTRACEPTIVE: Primary | ICD-10-CM

## 2023-04-14 PROCEDURE — 96372 THER/PROPH/DIAG INJ SC/IM: CPT | Performed by: FAMILY MEDICINE

## 2023-04-14 RX ORDER — MEDROXYPROGESTERONE ACETATE 150 MG/ML
150 INJECTION, SUSPENSION INTRAMUSCULAR
Status: SHIPPED | OUTPATIENT
Start: 2023-04-14

## 2023-04-14 RX ADMIN — MEDROXYPROGESTERONE ACETATE 150 MG: 150 INJECTION, SUSPENSION INTRAMUSCULAR at 11:03:00

## 2023-04-14 NOTE — PROGRESS NOTES
Patient is here for depo injection, verified name and . Next window dates given to pt - .  Injection given and tolerated well

## 2023-05-20 ENCOUNTER — HOSPITAL ENCOUNTER (OUTPATIENT)
Age: 48
Discharge: HOME | End: 2023-05-20
Payer: COMMERCIAL

## 2023-05-20 VITALS
OXYGEN SATURATION: 99 % | SYSTOLIC BLOOD PRESSURE: 124 MMHG | HEART RATE: 90 BPM | TEMPERATURE: 98 F | DIASTOLIC BLOOD PRESSURE: 71 MMHG | RESPIRATION RATE: 18 BRPM

## 2023-05-20 DIAGNOSIS — J06.9 VIRAL URI WITH COUGH: Primary | ICD-10-CM

## 2023-05-20 LAB
S PYO AG THROAT QL: NEGATIVE
SARS-COV-2 RNA RESP QL NAA+PROBE: NOT DETECTED

## 2023-05-20 RX ORDER — CODEINE PHOSPHATE AND GUAIFENESIN 10; 100 MG/5ML; MG/5ML
5 SOLUTION ORAL EVERY 6 HOURS PRN
Qty: 118 ML | Refills: 0 | Status: SHIPPED | OUTPATIENT
Start: 2023-05-20

## 2023-05-20 RX ORDER — ALBUTEROL SULFATE 90 UG/1
2 AEROSOL, METERED RESPIRATORY (INHALATION) EVERY 4 HOURS PRN
Qty: 1 EACH | Refills: 0 | Status: SHIPPED | OUTPATIENT
Start: 2023-05-20 | End: 2023-06-19

## 2023-05-20 RX ORDER — BENZONATATE 100 MG/1
100 CAPSULE ORAL 3 TIMES DAILY PRN
Qty: 30 CAPSULE | Refills: 0 | Status: SHIPPED | OUTPATIENT
Start: 2023-05-20 | End: 2023-06-19

## 2023-07-01 ENCOUNTER — NURSE ONLY (OUTPATIENT)
Dept: FAMILY MEDICINE CLINIC | Facility: CLINIC | Age: 48
End: 2023-07-01

## 2023-07-01 DIAGNOSIS — Z30.42 ENCOUNTER FOR SURVEILLANCE OF INJECTABLE CONTRACEPTIVE: Primary | ICD-10-CM

## 2023-07-01 PROCEDURE — 96372 THER/PROPH/DIAG INJ SC/IM: CPT | Performed by: FAMILY MEDICINE

## 2023-07-01 RX ADMIN — MEDROXYPROGESTERONE ACETATE 150 MG: 150 INJECTION, SUSPENSION INTRAMUSCULAR at 09:53:00

## 2023-09-16 ENCOUNTER — NURSE ONLY (OUTPATIENT)
Dept: FAMILY MEDICINE CLINIC | Facility: CLINIC | Age: 48
End: 2023-09-16

## 2023-09-16 DIAGNOSIS — Z30.42 ENCOUNTER FOR SURVEILLANCE OF INJECTABLE CONTRACEPTIVE: Primary | ICD-10-CM

## 2023-09-16 PROCEDURE — 96372 THER/PROPH/DIAG INJ SC/IM: CPT | Performed by: FAMILY MEDICINE

## 2023-09-16 RX ADMIN — MEDROXYPROGESTERONE ACETATE 150 MG: 150 INJECTION, SUSPENSION INTRAMUSCULAR at 09:41:00

## 2023-12-09 ENCOUNTER — NURSE ONLY (OUTPATIENT)
Dept: FAMILY MEDICINE CLINIC | Facility: CLINIC | Age: 48
End: 2023-12-09
Payer: COMMERCIAL

## 2023-12-09 DIAGNOSIS — Z30.42 ENCOUNTER FOR DEPO-PROVERA CONTRACEPTION: Primary | ICD-10-CM

## 2023-12-09 PROCEDURE — 96372 THER/PROPH/DIAG INJ SC/IM: CPT | Performed by: FAMILY MEDICINE

## 2023-12-09 RX ADMIN — MEDROXYPROGESTERONE ACETATE 150 MG: 150 INJECTION, SUSPENSION INTRAMUSCULAR at 10:01:00

## 2023-12-09 NOTE — PROGRESS NOTES
Patient is here depo injection has verified name and  injection given and tolerated well next window dates given of - march 10

## 2023-12-18 ENCOUNTER — OFFICE VISIT (OUTPATIENT)
Dept: FAMILY MEDICINE CLINIC | Facility: CLINIC | Age: 48
End: 2023-12-18
Payer: COMMERCIAL

## 2023-12-18 VITALS
HEART RATE: 67 BPM | BODY MASS INDEX: 28.82 KG/M2 | WEIGHT: 173 LBS | SYSTOLIC BLOOD PRESSURE: 121 MMHG | DIASTOLIC BLOOD PRESSURE: 75 MMHG | HEIGHT: 65 IN

## 2023-12-18 DIAGNOSIS — R10.13 EPIGASTRIC PAIN: Primary | ICD-10-CM

## 2023-12-18 DIAGNOSIS — Z12.11 COLON CANCER SCREENING: ICD-10-CM

## 2023-12-18 DIAGNOSIS — N64.4 BREAST PAIN, RIGHT: ICD-10-CM

## 2023-12-18 PROCEDURE — 3078F DIAST BP <80 MM HG: CPT | Performed by: FAMILY MEDICINE

## 2023-12-18 PROCEDURE — 99213 OFFICE O/P EST LOW 20 MIN: CPT | Performed by: FAMILY MEDICINE

## 2023-12-18 PROCEDURE — 3008F BODY MASS INDEX DOCD: CPT | Performed by: FAMILY MEDICINE

## 2023-12-18 PROCEDURE — 3074F SYST BP LT 130 MM HG: CPT | Performed by: FAMILY MEDICINE

## 2024-01-25 ENCOUNTER — HOSPITAL ENCOUNTER (INPATIENT)
Facility: HOSPITAL | Age: 49
LOS: 1 days | Discharge: HOME | End: 2024-01-27
Attending: EMERGENCY MEDICINE | Admitting: INTERNAL MEDICINE
Payer: COMMERCIAL

## 2024-01-25 ENCOUNTER — APPOINTMENT (OUTPATIENT)
Dept: ULTRASOUND IMAGING | Facility: HOSPITAL | Age: 49
End: 2024-01-25
Attending: EMERGENCY MEDICINE
Payer: COMMERCIAL

## 2024-01-25 DIAGNOSIS — K81.9 CHOLECYSTITIS: ICD-10-CM

## 2024-01-25 DIAGNOSIS — R10.11 RUQ PAIN: Primary | ICD-10-CM

## 2024-01-25 LAB
ALBUMIN SERPL-MCNC: 4.1 G/DL (ref 3.2–4.8)
ALP LIVER SERPL-CCNC: 83 U/L
ALT SERPL-CCNC: 10 U/L
ANION GAP SERPL CALC-SCNC: 5 MMOL/L (ref 0–18)
AST SERPL-CCNC: 15 U/L (ref ?–34)
B-HCG UR QL: NEGATIVE
BASOPHILS # BLD AUTO: 0.04 X10(3) UL (ref 0–0.2)
BASOPHILS NFR BLD AUTO: 0.3 %
BILIRUB DIRECT SERPL-MCNC: 0.1 MG/DL (ref ?–0.3)
BILIRUB SERPL-MCNC: 0.3 MG/DL (ref 0.3–1.2)
BILIRUB UR QL: NEGATIVE
BUN BLD-MCNC: 7 MG/DL (ref 9–23)
BUN/CREAT SERPL: 7.9 (ref 10–20)
CALCIUM BLD-MCNC: 8.9 MG/DL (ref 8.7–10.4)
CHLORIDE SERPL-SCNC: 110 MMOL/L (ref 98–112)
CLARITY UR: CLEAR
CO2 SERPL-SCNC: 24 MMOL/L (ref 21–32)
CREAT BLD-MCNC: 0.89 MG/DL
DEPRECATED RDW RBC AUTO: 44.3 FL (ref 35.1–46.3)
EGFRCR SERPLBLD CKD-EPI 2021: 79 ML/MIN/1.73M2 (ref 60–?)
EOSINOPHIL # BLD AUTO: 0.88 X10(3) UL (ref 0–0.7)
EOSINOPHIL NFR BLD AUTO: 7.5 %
ERYTHROCYTE [DISTWIDTH] IN BLOOD BY AUTOMATED COUNT: 15.1 % (ref 11–15)
GLUCOSE BLD-MCNC: 114 MG/DL (ref 70–99)
GLUCOSE UR-MCNC: NORMAL MG/DL
HCT VFR BLD AUTO: 37.7 %
HGB BLD-MCNC: 11.7 G/DL
IMM GRANULOCYTES # BLD AUTO: 0.05 X10(3) UL (ref 0–1)
IMM GRANULOCYTES NFR BLD: 0.4 %
KETONES UR-MCNC: NEGATIVE MG/DL
LEUKOCYTE ESTERASE UR QL STRIP.AUTO: NEGATIVE
LIPASE SERPL-CCNC: 34 U/L (ref 13–75)
LYMPHOCYTES # BLD AUTO: 1.42 X10(3) UL (ref 1–4)
LYMPHOCYTES NFR BLD AUTO: 12.1 %
MCH RBC QN AUTO: 25.3 PG (ref 26–34)
MCHC RBC AUTO-ENTMCNC: 31 G/DL (ref 31–37)
MCV RBC AUTO: 81.6 FL
MONOCYTES # BLD AUTO: 0.68 X10(3) UL (ref 0.1–1)
MONOCYTES NFR BLD AUTO: 5.8 %
NEUTROPHILS # BLD AUTO: 8.68 X10 (3) UL (ref 1.5–7.7)
NEUTROPHILS # BLD AUTO: 8.68 X10(3) UL (ref 1.5–7.7)
NEUTROPHILS NFR BLD AUTO: 73.9 %
NITRITE UR QL STRIP.AUTO: NEGATIVE
OSMOLALITY SERPL CALC.SUM OF ELEC: 287 MOSM/KG (ref 275–295)
PH UR: 5.5 [PH] (ref 5–8)
PLATELET # BLD AUTO: 282 10(3)UL (ref 150–450)
POTASSIUM SERPL-SCNC: 4 MMOL/L (ref 3.5–5.1)
PROT SERPL-MCNC: 7.3 G/DL (ref 5.7–8.2)
RBC # BLD AUTO: 4.62 X10(6)UL
SODIUM SERPL-SCNC: 139 MMOL/L (ref 136–145)
SP GR UR STRIP: 1.03 (ref 1–1.03)
UROBILINOGEN UR STRIP-ACNC: NORMAL
WBC # BLD AUTO: 11.8 X10(3) UL (ref 4–11)

## 2024-01-25 PROCEDURE — 76705 ECHO EXAM OF ABDOMEN: CPT | Performed by: EMERGENCY MEDICINE

## 2024-01-25 PROCEDURE — 99222 1ST HOSP IP/OBS MODERATE 55: CPT | Performed by: HOSPITALIST

## 2024-01-25 RX ORDER — MORPHINE SULFATE 4 MG/ML
4 INJECTION, SOLUTION INTRAMUSCULAR; INTRAVENOUS ONCE
Status: COMPLETED | OUTPATIENT
Start: 2024-01-25 | End: 2024-01-26

## 2024-01-25 RX ORDER — METRONIDAZOLE 500 MG/100ML
500 INJECTION, SOLUTION INTRAVENOUS ONCE
Status: COMPLETED | OUTPATIENT
Start: 2024-01-25 | End: 2024-01-26

## 2024-01-25 RX ORDER — ONDANSETRON 2 MG/ML
4 INJECTION INTRAMUSCULAR; INTRAVENOUS ONCE
Status: DISCONTINUED | OUTPATIENT
Start: 2024-01-25 | End: 2024-01-26

## 2024-01-25 RX ORDER — CIPROFLOXACIN 2 MG/ML
400 INJECTION, SOLUTION INTRAVENOUS ONCE
Status: COMPLETED | OUTPATIENT
Start: 2024-01-25 | End: 2024-01-26

## 2024-01-25 RX ORDER — KETOROLAC TROMETHAMINE 30 MG/ML
30 INJECTION, SOLUTION INTRAMUSCULAR; INTRAVENOUS ONCE
Status: DISCONTINUED | OUTPATIENT
Start: 2024-01-25 | End: 2024-01-25

## 2024-01-25 RX ORDER — KETOROLAC TROMETHAMINE 15 MG/ML
15 INJECTION, SOLUTION INTRAMUSCULAR; INTRAVENOUS ONCE
Status: COMPLETED | OUTPATIENT
Start: 2024-01-25 | End: 2024-01-25

## 2024-01-26 ENCOUNTER — HOSPITAL ENCOUNTER (OUTPATIENT)
Dept: GENERAL RADIOLOGY | Facility: HOSPITAL | Age: 49
Discharge: HOME | End: 2024-01-26
Attending: SPECIALIST
Payer: COMMERCIAL

## 2024-01-26 ENCOUNTER — ANESTHESIA (OUTPATIENT)
Dept: SURGERY | Facility: HOSPITAL | Age: 49
End: 2024-01-26
Payer: COMMERCIAL

## 2024-01-26 ENCOUNTER — APPOINTMENT (OUTPATIENT)
Dept: CT IMAGING | Facility: HOSPITAL | Age: 49
End: 2024-01-26
Attending: EMERGENCY MEDICINE
Payer: COMMERCIAL

## 2024-01-26 ENCOUNTER — ANESTHESIA EVENT (OUTPATIENT)
Dept: SURGERY | Facility: HOSPITAL | Age: 49
End: 2024-01-26
Payer: COMMERCIAL

## 2024-01-26 DIAGNOSIS — R52 PAIN: ICD-10-CM

## 2024-01-26 LAB
ANTIBODY SCREEN: NEGATIVE
HCT VFR BLD AUTO: 35.3 %
HGB BLD-MCNC: 11 G/DL
RH BLOOD TYPE: POSITIVE

## 2024-01-26 PROCEDURE — 0FT44ZZ RESECTION OF GALLBLADDER, PERCUTANEOUS ENDOSCOPIC APPROACH: ICD-10-PCS | Performed by: SPECIALIST

## 2024-01-26 PROCEDURE — 74300 X-RAY BILE DUCTS/PANCREAS: CPT | Performed by: SPECIALIST

## 2024-01-26 PROCEDURE — 99233 SBSQ HOSP IP/OBS HIGH 50: CPT | Performed by: HOSPITALIST

## 2024-01-26 PROCEDURE — 74177 CT ABD & PELVIS W/CONTRAST: CPT | Performed by: EMERGENCY MEDICINE

## 2024-01-26 PROCEDURE — BF101ZZ FLUOROSCOPY OF BILE DUCTS USING LOW OSMOLAR CONTRAST: ICD-10-PCS | Performed by: SPECIALIST

## 2024-01-26 RX ORDER — EPHEDRINE SULFATE 50 MG/ML
INJECTION, SOLUTION INTRAVENOUS AS NEEDED
Status: DISCONTINUED | OUTPATIENT
Start: 2024-01-26 | End: 2024-01-26 | Stop reason: SURG

## 2024-01-26 RX ORDER — NALOXONE HYDROCHLORIDE 0.4 MG/ML
0.08 INJECTION, SOLUTION INTRAMUSCULAR; INTRAVENOUS; SUBCUTANEOUS AS NEEDED
Status: DISCONTINUED | OUTPATIENT
Start: 2024-01-26 | End: 2024-01-26 | Stop reason: HOSPADM

## 2024-01-26 RX ORDER — HYDRALAZINE HYDROCHLORIDE 20 MG/ML
10 INJECTION INTRAMUSCULAR; INTRAVENOUS EVERY 4 HOURS PRN
Status: DISCONTINUED | OUTPATIENT
Start: 2024-01-26 | End: 2024-01-27

## 2024-01-26 RX ORDER — NEOSTIGMINE METHYLSULFATE 1 MG/ML
INJECTION, SOLUTION INTRAVENOUS AS NEEDED
Status: DISCONTINUED | OUTPATIENT
Start: 2024-01-26 | End: 2024-01-26 | Stop reason: SURG

## 2024-01-26 RX ORDER — CLINDAMYCIN PHOSPHATE 900 MG/50ML
900 INJECTION, SOLUTION INTRAVENOUS EVERY 8 HOURS
Status: COMPLETED | OUTPATIENT
Start: 2024-01-26 | End: 2024-01-27

## 2024-01-26 RX ORDER — BUPIVACAINE HYDROCHLORIDE 5 MG/ML
INJECTION, SOLUTION EPIDURAL; INTRACAUDAL AS NEEDED
Status: DISCONTINUED | OUTPATIENT
Start: 2024-01-26 | End: 2024-01-26 | Stop reason: HOSPADM

## 2024-01-26 RX ORDER — MORPHINE SULFATE 4 MG/ML
4 INJECTION, SOLUTION INTRAMUSCULAR; INTRAVENOUS EVERY 10 MIN PRN
Status: DISCONTINUED | OUTPATIENT
Start: 2024-01-26 | End: 2024-01-26 | Stop reason: HOSPADM

## 2024-01-26 RX ORDER — ONDANSETRON 2 MG/ML
INJECTION INTRAMUSCULAR; INTRAVENOUS AS NEEDED
Status: DISCONTINUED | OUTPATIENT
Start: 2024-01-26 | End: 2024-01-26 | Stop reason: SURG

## 2024-01-26 RX ORDER — FAMOTIDINE 10 MG/ML
20 INJECTION, SOLUTION INTRAVENOUS 2 TIMES DAILY
Status: DISCONTINUED | OUTPATIENT
Start: 2024-01-26 | End: 2024-01-26

## 2024-01-26 RX ORDER — DEXAMETHASONE SODIUM PHOSPHATE 4 MG/ML
VIAL (ML) INJECTION AS NEEDED
Status: DISCONTINUED | OUTPATIENT
Start: 2024-01-26 | End: 2024-01-26 | Stop reason: SURG

## 2024-01-26 RX ORDER — HYDROMORPHONE HYDROCHLORIDE 1 MG/ML
0.2 INJECTION, SOLUTION INTRAMUSCULAR; INTRAVENOUS; SUBCUTANEOUS EVERY 5 MIN PRN
Status: DISCONTINUED | OUTPATIENT
Start: 2024-01-26 | End: 2024-01-26 | Stop reason: HOSPADM

## 2024-01-26 RX ORDER — GLYCOPYRROLATE 0.2 MG/ML
INJECTION, SOLUTION INTRAMUSCULAR; INTRAVENOUS AS NEEDED
Status: DISCONTINUED | OUTPATIENT
Start: 2024-01-26 | End: 2024-01-26 | Stop reason: SURG

## 2024-01-26 RX ORDER — LIDOCAINE HYDROCHLORIDE 10 MG/ML
INJECTION, SOLUTION EPIDURAL; INFILTRATION; INTRACAUDAL; PERINEURAL AS NEEDED
Status: DISCONTINUED | OUTPATIENT
Start: 2024-01-26 | End: 2024-01-26 | Stop reason: SURG

## 2024-01-26 RX ORDER — HYDROMORPHONE HYDROCHLORIDE 1 MG/ML
0.6 INJECTION, SOLUTION INTRAMUSCULAR; INTRAVENOUS; SUBCUTANEOUS EVERY 5 MIN PRN
Status: DISCONTINUED | OUTPATIENT
Start: 2024-01-26 | End: 2024-01-26 | Stop reason: HOSPADM

## 2024-01-26 RX ORDER — MORPHINE SULFATE 4 MG/ML
4 INJECTION, SOLUTION INTRAMUSCULAR; INTRAVENOUS EVERY 30 MIN PRN
Status: DISPENSED | OUTPATIENT
Start: 2024-01-26 | End: 2024-01-26

## 2024-01-26 RX ORDER — HYDROMORPHONE HYDROCHLORIDE 1 MG/ML
0.4 INJECTION, SOLUTION INTRAMUSCULAR; INTRAVENOUS; SUBCUTANEOUS EVERY 2 HOUR PRN
Status: DISCONTINUED | OUTPATIENT
Start: 2024-01-26 | End: 2024-01-27

## 2024-01-26 RX ORDER — PROCHLORPERAZINE EDISYLATE 5 MG/ML
5 INJECTION INTRAMUSCULAR; INTRAVENOUS EVERY 8 HOURS PRN
Status: DISCONTINUED | OUTPATIENT
Start: 2024-01-26 | End: 2024-01-26 | Stop reason: HOSPADM

## 2024-01-26 RX ORDER — MORPHINE SULFATE 4 MG/ML
4 INJECTION, SOLUTION INTRAMUSCULAR; INTRAVENOUS EVERY 2 HOUR PRN
Status: DISCONTINUED | OUTPATIENT
Start: 2024-01-26 | End: 2024-01-26

## 2024-01-26 RX ORDER — HYDROMORPHONE HYDROCHLORIDE 1 MG/ML
0.4 INJECTION, SOLUTION INTRAMUSCULAR; INTRAVENOUS; SUBCUTANEOUS EVERY 5 MIN PRN
Status: DISCONTINUED | OUTPATIENT
Start: 2024-01-26 | End: 2024-01-26 | Stop reason: HOSPADM

## 2024-01-26 RX ORDER — ROCURONIUM BROMIDE 10 MG/ML
INJECTION, SOLUTION INTRAVENOUS AS NEEDED
Status: DISCONTINUED | OUTPATIENT
Start: 2024-01-26 | End: 2024-01-26 | Stop reason: SURG

## 2024-01-26 RX ORDER — ONDANSETRON 2 MG/ML
4 INJECTION INTRAMUSCULAR; INTRAVENOUS EVERY 6 HOURS PRN
Status: DISCONTINUED | OUTPATIENT
Start: 2024-01-26 | End: 2024-01-26 | Stop reason: HOSPADM

## 2024-01-26 RX ORDER — ONDANSETRON 2 MG/ML
4 INJECTION INTRAMUSCULAR; INTRAVENOUS EVERY 4 HOURS PRN
Status: ACTIVE | OUTPATIENT
Start: 2024-01-26 | End: 2024-01-26

## 2024-01-26 RX ORDER — DEXTROSE AND SODIUM CHLORIDE 5; .45 G/100ML; G/100ML
INJECTION, SOLUTION INTRAVENOUS CONTINUOUS
Status: DISCONTINUED | OUTPATIENT
Start: 2024-01-26 | End: 2024-01-27

## 2024-01-26 RX ORDER — ENOXAPARIN SODIUM 100 MG/ML
40 INJECTION SUBCUTANEOUS DAILY
Status: DISCONTINUED | OUTPATIENT
Start: 2024-01-27 | End: 2024-01-27

## 2024-01-26 RX ORDER — SODIUM CHLORIDE, SODIUM LACTATE, POTASSIUM CHLORIDE, CALCIUM CHLORIDE 600; 310; 30; 20 MG/100ML; MG/100ML; MG/100ML; MG/100ML
INJECTION, SOLUTION INTRAVENOUS CONTINUOUS PRN
Status: DISCONTINUED | OUTPATIENT
Start: 2024-01-26 | End: 2024-01-26 | Stop reason: SURG

## 2024-01-26 RX ORDER — MORPHINE SULFATE 4 MG/ML
2 INJECTION, SOLUTION INTRAMUSCULAR; INTRAVENOUS EVERY 10 MIN PRN
Status: DISCONTINUED | OUTPATIENT
Start: 2024-01-26 | End: 2024-01-26 | Stop reason: HOSPADM

## 2024-01-26 RX ORDER — OXYCODONE HYDROCHLORIDE 5 MG/1
5 TABLET ORAL EVERY 4 HOURS PRN
Status: DISCONTINUED | OUTPATIENT
Start: 2024-01-26 | End: 2024-01-27

## 2024-01-26 RX ORDER — MORPHINE SULFATE 2 MG/ML
2 INJECTION, SOLUTION INTRAMUSCULAR; INTRAVENOUS EVERY 2 HOUR PRN
Status: DISCONTINUED | OUTPATIENT
Start: 2024-01-26 | End: 2024-01-26

## 2024-01-26 RX ORDER — MORPHINE SULFATE 10 MG/ML
6 INJECTION, SOLUTION INTRAMUSCULAR; INTRAVENOUS EVERY 10 MIN PRN
Status: DISCONTINUED | OUTPATIENT
Start: 2024-01-26 | End: 2024-01-26 | Stop reason: HOSPADM

## 2024-01-26 RX ORDER — ONDANSETRON 2 MG/ML
4 INJECTION INTRAMUSCULAR; INTRAVENOUS EVERY 6 HOURS PRN
Status: DISCONTINUED | OUTPATIENT
Start: 2024-01-26 | End: 2024-01-26

## 2024-01-26 RX ORDER — SODIUM CHLORIDE, SODIUM LACTATE, POTASSIUM CHLORIDE, CALCIUM CHLORIDE 600; 310; 30; 20 MG/100ML; MG/100ML; MG/100ML; MG/100ML
INJECTION, SOLUTION INTRAVENOUS CONTINUOUS
Status: DISCONTINUED | OUTPATIENT
Start: 2024-01-26 | End: 2024-01-26 | Stop reason: HOSPADM

## 2024-01-26 RX ORDER — DEXTROSE MONOHYDRATE, SODIUM CHLORIDE, AND POTASSIUM CHLORIDE 50; 1.49; 4.5 G/1000ML; G/1000ML; G/1000ML
INJECTION, SOLUTION INTRAVENOUS CONTINUOUS
Status: DISCONTINUED | OUTPATIENT
Start: 2024-01-26 | End: 2024-01-27

## 2024-01-26 RX ORDER — HYDROMORPHONE HYDROCHLORIDE 1 MG/ML
0.8 INJECTION, SOLUTION INTRAMUSCULAR; INTRAVENOUS; SUBCUTANEOUS EVERY 2 HOUR PRN
Status: DISCONTINUED | OUTPATIENT
Start: 2024-01-26 | End: 2024-01-27

## 2024-01-26 RX ORDER — CLINDAMYCIN PHOSPHATE 150 MG/ML
INJECTION, SOLUTION INTRAVENOUS AS NEEDED
Status: DISCONTINUED | OUTPATIENT
Start: 2024-01-26 | End: 2024-01-26 | Stop reason: SURG

## 2024-01-26 RX ORDER — OXYCODONE HYDROCHLORIDE 5 MG/1
10 TABLET ORAL EVERY 4 HOURS PRN
Status: DISCONTINUED | OUTPATIENT
Start: 2024-01-26 | End: 2024-01-27

## 2024-01-26 RX ADMIN — ROCURONIUM BROMIDE 20 MG: 10 INJECTION, SOLUTION INTRAVENOUS at 13:51:00

## 2024-01-26 RX ADMIN — SODIUM CHLORIDE, SODIUM LACTATE, POTASSIUM CHLORIDE, CALCIUM CHLORIDE: 600; 310; 30; 20 INJECTION, SOLUTION INTRAVENOUS at 14:23:00

## 2024-01-26 RX ADMIN — GLYCOPYRROLATE 0.6 MG: 0.2 INJECTION, SOLUTION INTRAMUSCULAR; INTRAVENOUS at 14:36:00

## 2024-01-26 RX ADMIN — SODIUM CHLORIDE, SODIUM LACTATE, POTASSIUM CHLORIDE, CALCIUM CHLORIDE: 600; 310; 30; 20 INJECTION, SOLUTION INTRAVENOUS at 14:09:00

## 2024-01-26 RX ADMIN — DEXAMETHASONE SODIUM PHOSPHATE 8 MG: 4 MG/ML VIAL (ML) INJECTION at 13:59:00

## 2024-01-26 RX ADMIN — CLINDAMYCIN PHOSPHATE 900 MG: 150 INJECTION, SOLUTION INTRAVENOUS at 13:59:00

## 2024-01-26 RX ADMIN — ONDANSETRON 4 MG: 2 INJECTION INTRAMUSCULAR; INTRAVENOUS at 14:33:00

## 2024-01-26 RX ADMIN — EPHEDRINE SULFATE 5 MG: 50 INJECTION, SOLUTION INTRAVENOUS at 14:09:00

## 2024-01-26 RX ADMIN — EPHEDRINE SULFATE 10 MG: 50 INJECTION, SOLUTION INTRAVENOUS at 14:11:00

## 2024-01-26 RX ADMIN — SODIUM CHLORIDE, SODIUM LACTATE, POTASSIUM CHLORIDE, CALCIUM CHLORIDE: 600; 310; 30; 20 INJECTION, SOLUTION INTRAVENOUS at 14:29:00

## 2024-01-26 RX ADMIN — EPHEDRINE SULFATE 10 MG: 50 INJECTION, SOLUTION INTRAVENOUS at 14:08:00

## 2024-01-26 RX ADMIN — LIDOCAINE HYDROCHLORIDE 50 MG: 10 INJECTION, SOLUTION EPIDURAL; INFILTRATION; INTRACAUDAL; PERINEURAL at 13:51:00

## 2024-01-26 RX ADMIN — SODIUM CHLORIDE, SODIUM LACTATE, POTASSIUM CHLORIDE, CALCIUM CHLORIDE: 600; 310; 30; 20 INJECTION, SOLUTION INTRAVENOUS at 13:47:00

## 2024-01-26 RX ADMIN — NEOSTIGMINE METHYLSULFATE 3 MG: 1 INJECTION, SOLUTION INTRAVENOUS at 14:36:00

## 2024-01-26 NOTE — BRIEF OP NOTE
Patients Name: Sussy Mckay  Attending Physician: Lupe Muro DO  Operating Physician: MICHAEL ALVAREZ MD  CSN: [id number]     Location:  OR  MRN: [id number]    YOB: 1975  Admission Date: 1/25/2024  Operation Date: 1/26/2024    Brief Operative Report    Pre-Operative Diagnosis: Cholecystitis  cholelithiasis    Post-Operative Diagnosis: Same as above.    Procedure Performed: LAPAROSCOPIC CHOLECYSTECTOMY   Intra-operative cholangiogram    Surgeon: MICHAEL ALVAREZ MD    Assistants: Fabricio Almodovar SA    Anesthesia: General    Attending:  Jina    EBL: 5cc    Findings: acute cholecystitis   cholelithiasis    Specimens:    ID Type Source Tests Collected by Time Destination   1 : 1. Gallbladder and contents Tissue Gallbladder SURGICAL PATHOLOGY TISSUE Michael Alvarez MD 1/26/2024 1410        Drains: none    Complications: None    Disposition: stable     MICHAEL ALVAREZ MD  1/26/2024  2:43 PM

## 2024-01-26 NOTE — OPERATIVE REPORT
[facility]    PATIENT'S NAME: ANEESH PIERCE   ATTENDING PHYSICIAN: Lupe Muro DO   OPERATING PHYSICIAN: Erik Moran MD   PATIENT ACCOUNT#:   [id number]    LOCATION:  [facility] PACU 9 [facility] 10  MEDICAL RECORD #:   [id number]       YOB: 1975  ADMISSION DATE:       01/25/2024      OPERATION DATE:  01/26/2024    OPERATIVE REPORT    PREOPERATIVE DIAGNOSIS:  Acute cholecystitis, cholelithiasis.  POSTOPERATIVE DIAGNOSIS:  Acute cholecystitis, cholelithiasis.  PROCEDURE:  Laparoscopic cholecystectomy, intraoperative cholangiogram.    ASSISTANT:  ARGENTINA Mckinley.    ANESTHESIA:  General.    BLOOD LOSS:  5 mL.    SPECIMENS:  Gallbladder.    DRAINS:  None.    COMPLICATIONS:  None.    DISPOSITION:  Stable.    INDICATIONS:  The patient presented with acute cholecystitis, cholelithiasis.    FINDINGS:  Acute cholecystitis, cholelithiasis.    OPERATIVE TECHNIQUE:  She came in the operating room, underwent general endotracheal anesthesia.  Compression boots were placed.  We prepped and draped the abdomen.  We began by making a supraumbilical incision.  Under direct vision, inserted the #11 bladeless trocar, the upper midline 11, two lateral 5's bladeless trocars were placed under direct vision.  The gallbladder was very dilated and tense.  We did aspirate this with green bile.  Using various graspers, we brought up the gallbladder, located the cystic duct and cystic artery in 2 views.  Clipped the cystic duct proximally, opened this up with the microscissors.  Using an intraoperative Taut cholangiocatheter, inserted this in the cystic duct.  Did an intraoperative cholangiogram that showed normal proximal and distal filling, no intraluminal defects.  Removed the catheter.  Clipped the cystic duct, divided cystic duct, Endoloop placed around its end.  Cystic artery triply clipped and divided.  Gallbladder removed with electrocoagulation, placed in an Endobag, and removed.   Checked for residual bleeding.  There was none, though we did place spray Surgicel into the bed of the liver as stated.  All instruments were removed under direct vision.  By the way, we did remove, as stated, the gallbladder within the Endobag, removed that.  At this point, we then closed the defect in the supraumbilical area with spray Surgicel.  The fascia closed with a closure device with Vicryl.  All skin with Monocryl and Dermabond.  We infiltrated all wounds with 0.5% Marcaine without.  No complications.  Patient tolerated the procedure well.    Dictated By Erik Moran MD  d: 01/26/2024 14:47:59  t: 01/26/2024 16:22:33  [facility] [id number]  [facility]/

## 2024-01-26 NOTE — ED INITIAL ASSESSMENT (HPI)
Pt to ED with c/o intermittent RUQ and LUQ pain x3 months. PCP sent pt to Gastroenterologist, appt in March. Pt states nausea and bloating, denies v/d. Pt states abdominal pain is radiating to middle of back. Hx of fatty liver. Abdominal distension noted.  Urinary frequency x6 months.

## 2024-01-26 NOTE — PLAN OF CARE
Patient received from ED. Pain managed with Morphine. NPO. Mild nausea, declined medication.  at bedside. NPO- IVF, antibiotics infused. Pending plan. Safety precautions in place.

## 2024-01-26 NOTE — PROGRESS NOTES
[facility]    Progress Note    Sussy Mckay Patient Status:  Inpatient    1975 MRN [id number]   Location [facility] OPERATING ROOM Attending Lupe Muro DO   Hosp Day # 0 PCP JASWANT ORTIZ MD     Chief complaint abd pain     Subjective:   Sussy Mckay is a(n) 48 year old female Pt c/o abd pain but doing ok     ROS:   No cp, sob   No c/d   No n/v     Objective:   Blood pressure 139/84, pulse 93, temperature 97.5 °F (36.4 °C), resp. rate 16, height 5' 5\" (1.651 m), weight 173 lb (78.5 kg), SpO2 94%, not currently breastfeeding.      Intake/Output Summary (Last 24 hours) at 2024 1505  Last data filed at 2024 1434  Gross per 24 hour   Intake 2318.75 ml   Output 5 ml   Net 2313.75 ml       Patient Weight(s) for the past 336 hrs:   Weight   246 173 lb (78.5 kg)   24 173 lb (78.5 kg)           General appearance: alert, appears stated age and cooperative  Pulmonary:  clear to auscultation bilaterally  Cardiovascular: S1, S2 normal, no murmur, click, rub or gallop, regular rate and rhythm  Abdominal: soft, tender in ruq ; bowel sounds normal; no masses,  no organomegaly  Extremities: extremities normal, atraumatic, no cyanosis or edema        Medicines:     Current Facility-Administered Medications   Medication Dose Route Frequency    [MAR Hold] hydrALAzine (Apresoline) 20 mg/mL injection 10 mg  10 mg Intravenous Q4H PRN    dextrose 5%-sodium chloride 0.45% infusion   Intravenous Continuous    lactated ringers infusion   Intravenous Continuous    lactated ringers IV bolus 500 mL  500 mL Intravenous Once PRN    atropine 0.1 MG/ML injection 0.5 mg  0.5 mg Intravenous PRN    naloxone (Narcan) 0.4 MG/ML injection 0.08 mg  0.08 mg Intravenous PRN    fentaNYL (Sublimaze) 50 mcg/mL injection 25 mcg  25 mcg Intravenous Q5 Min PRN    fentaNYL (Sublimaze) 50 mcg/mL injection 50 mcg  50 mcg Intravenous Q5 Min PRN     HYDROmorphone (Dilaudid) 1 MG/ML injection 0.2 mg  0.2 mg Intravenous Q5 Min PRN    HYDROmorphone (Dilaudid) 1 MG/ML injection 0.4 mg  0.4 mg Intravenous Q5 Min PRN    HYDROmorphone (Dilaudid) 1 MG/ML injection 0.6 mg  0.6 mg Intravenous Q5 Min PRN    morphINE PF 4 MG/ML injection 2 mg  2 mg Intravenous Q10 Min PRN    morphINE PF 4 MG/ML injection 4 mg  4 mg Intravenous Q10 Min PRN    morphINE PF 10 MG/ML injection 6 mg  6 mg Intravenous Q10 Min PRN    ondansetron (Zofran) 4 MG/2ML injection 4 mg  4 mg Intravenous Q6H PRN    prochlorperazine (Compazine) 10 MG/2ML injection 5 mg  5 mg Intravenous Q8H PRN    bupivacaine PF (Marcaine) 0.5% injection    PRN    iohexol (Omnipaque) 300 MG/ML injection    PRN       Lab Results   Component Value Date    WBC 11.8 (H) 01/25/2024    HGB 11.0 (L) 01/26/2024    HCT 35.3 01/26/2024    .0 01/25/2024    CREATSERUM 0.89 01/25/2024    BUN 7 (L) 01/25/2024     01/25/2024    K 4.0 01/25/2024     01/25/2024    CO2 24.0 01/25/2024     (H) 01/25/2024    CA 8.9 01/25/2024    ALB 4.1 01/25/2024    ALKPHO 83 01/25/2024    BILT 0.3 01/25/2024    TP 7.3 01/25/2024    AST 15 01/25/2024    ALT 10 01/25/2024    TSH 1.640 01/17/2022    SABA 55 08/02/2017    LIP 34 01/25/2024       CT ABDOMEN PELVIS IV CONTRAST, NO ORAL (ER)    Result Date: 1/26/2024  CONCLUSION:   Cholelithiasis with mild gallbladder distension.  Consider correlation with ultrasound of the gallbladder.  Appendectomy.  Colonic diverticulosis.  Circumferential urinary bladder wall thickening which may relate to lack of distension.  If there is clinical concern for cystitis, correlation with urinalysis can be performed.  Fibroid uterus.  A preliminary report was issued by the [facility] teleradiology service. There are no major discrepancies.    Dictated by (CST): Jasiel Akers MD on 1/26/2024 at 7:37 AM     Finalized by (CST): Jasiel Akers MD on 1/26/2024 at 7:41 AM          US GALLBLADDER  (CPT=76705)    Result Date: 1/26/2024  CONCLUSION:   Cholelithiasis.  No evidence of gallbladder wall thickening or pericholecystic fluid.  Negative sonographic Starr sign.  Hepatic steatosis.  A preliminary report was issued by the [facility] teleradiology service. There are no major discrepancies.    Dictated by (CST): Jasiel Akers MD on 1/26/2024 at 6:43 AM     Finalized by (CST): Jasiel Akers MD on 1/26/2024 at 6:45 AM               Results:     CBC:    Lab Results   Component Value Date    WBC 11.8 (H) 01/25/2024    WBC 8.9 01/17/2022    WBC 8.9 02/17/2020     Lab Results   Component Value Date    HEMOGLOBIN 10.8 (A) 01/23/2023    HEMOGLOBIN 10.5 (A) 10/20/2022    HGB 11.0 (L) 01/26/2024    HGB 11.7 (L) 01/25/2024    HGB 9.4 (L) 01/17/2022      Lab Results   Component Value Date    .0 01/25/2024    .0 01/17/2022    .0 02/17/2020       Recent Labs   Lab 01/25/24  2116   *   BUN 7*   CREATSERUM 0.89   CA 8.9      K 4.0      CO2 24.0             Assessment and Plan:         Symptomatic cholelithiasis  Surgery consulted, patient will remain n.p.o. for now, use morphine as needed for pain, Zofran for nausea.  IV fluids initiated.   - abx   - ivfs   - apprec surg consult - surgery today      Prophylaxis  SCDs, heparin on hold till patient evaluated by surgery.     CODE STATUS  Full     Primary care physician  JASWANT ORTIZ MD                Chart reviewed, including current vitals, notes, labs and imaging  Labs ordered and medications adjusted as outlined above  Coordinate care with care team/consultants  Discussed with patient results of tests, management plan as outlined above, and the need for ongoing hospitalization  D/w RN     MDM high        1/26/2024

## 2024-01-26 NOTE — ED QUICK NOTES
Orders for admission, patient is aware of plan and ready to go upstairs. Any questions, please call ED RN Addie at extension [phone].     Patient Covid vaccination status: Fully vaccinated     COVID Test Ordered in ED: None    COVID Suspicion at Admission: N/A    Running Infusions: Flagyl 100ml/hr    Mental Status/LOC at time of transport: A&Ox4    Other pertinent information: NPO  CIWA score: N/A   NIH score:  N/A

## 2024-01-26 NOTE — CONSULTS
[facility]    Report of Consultation    Sussy Mckay Patient Status:  Inpatient    1975 MRN [id number]   Location [facility] 4W/SW/SE Attending Lupe Muro,    Hosp Day # 0 PCP JASWANT ORTIZ MD     Date of Admission:  2024  Date of Consult:  2024    Reason for Consultation:  Abd pain    History of Present Illness:  Sussy Mckay is a a(n) 48 year old female. Epigastric pain several months  but increased yesterday    No n or v or change bowels  or chills states in    had appendectomy    History:  Past Medical History:   Diagnosis Date    Miscarriage      Past Surgical History:   Procedure Laterality Date    ANESTH,VAGINAL DELIVERY      APPENDECTOMY      COLONOSCOPY N/A 2017    Procedure: COLONOSCOPY;  Surgeon: Sheeba Castro MD;  Location: [facility] ENDOSCOPY     Family History   Problem Relation Age of Onset    Other (Other) Father     Diabetes Mother     Hypertension Mother     Cancer Maternal Grandmother         pancreatic 85      reports that she has never smoked. She has never used smokeless tobacco. She reports current alcohol use. She reports that she does not use drugs.    Allergies:  Allergies   Allergen Reactions    Shrimp HIVES and Tightness in Throat     Confirmed allergy with 10/8/18 Adult Allergy Food Profile    Penicillins SWELLING       Medications:    Current Facility-Administered Medications:     morphINE PF 2 MG/ML injection 2 mg, 2 mg, Intravenous, Q2H PRN **OR** morphINE PF 4 MG/ML injection 4 mg, 4 mg, Intravenous, Q2H PRN    hydrALAzine (Apresoline) 20 mg/mL injection 10 mg, 10 mg, Intravenous, Q4H PRN    dextrose 5%-sodium chloride 0.45% infusion, , Intravenous, Continuous    famotidine (Pepcid) 20 mg/2mL injection 20 mg, 20 mg, Intravenous, BID    ondansetron (Zofran) 4 MG/2ML injection 4 mg, 4 mg, Intravenous, Once  Facility-Administered Medications Prior to Admission   Medication Dose  Route Frequency Provider Last Rate Last Admin    medroxyPROGESTERone Acetate ILEANA 150 mg  150 mg Intramuscular Q3 Months Bull Walker MD   150 mg at 12/09/23 1001     Medications Prior to Admission   Medication Sig Dispense Refill Last Dose    Spacer/Aero-Hold Chamber Mask Does not apply Misc Use with inhaler (Patient not taking: Reported on 12/18/2023) 1 each 0 Not Taking    guaiFENesin-codeine 100-10 MG/5ML Oral Solution Take 5 mL by mouth every 6 (six) hours as needed for cough. (Patient not taking: Reported on 12/18/2023) 118 mL 0 Not Taking    cyclobenzaprine 5 MG Oral Tab Take 1 tablet (5 mg total) by mouth 3 (three) times daily as needed. (Patient not taking: Reported on 12/18/2023) 30 tablet 0 Not Taking    ergocalciferol 1.25 MG (57463 UT) Oral Cap  (Patient not taking: Reported on 12/18/2023)       famotidine 40 MG Oral Tab Take 1 tablet (40 mg total) by mouth daily. (Patient not taking: Reported on 1/23/2023) 60 tablet 0 Not Taking    Ferrous Sulfate 325 (65 Fe) MG Oral Tab Take 1 tablet (325 mg total) by mouth 2 (two) times a day. (Patient not taking: Reported on 1/23/2023) 60 tablet 3 Not Taking    folic acid 1 MG Oral Tab Take 1 tablet (1 mg total) by mouth daily. (Patient not taking: Reported on 1/23/2023) 90 tablet 1 Not Taking       Review of Systems:  A ten point review of systems was negative except as noted.    Physical Exam:  Blood pressure 117/85, pulse 75, temperature 98.2 °F (36.8 °C), temperature source Oral, resp. rate 18, height 5' 5\" (1.651 m), weight 173 lb (78.5 kg), SpO2 97%, not currently breastfeeding.    General: Alert, orientated x3.  Cooperative.  No apparent distress.  HEENT: Exam is unremarkable.    Lungs:per attending  Cardiac: per attending  Abdomen:  Soft, non-distended, obese   vague epigastric tenderness  Skin: Normal texture and turgor.  Neurologic: per attending  Laboratory Data:  Lab Results   Component Value Date    WBC 11.8 (H) 01/25/2024    HGB 11.0 (L)  01/26/2024    HCT 35.3 01/26/2024    .0 01/25/2024    CREATSERUM 0.89 01/25/2024    BUN 7 (L) 01/25/2024     01/25/2024    K 4.0 01/25/2024     01/25/2024    CO2 24.0 01/25/2024     (H) 01/25/2024    CA 8.9 01/25/2024    ALB 4.1 01/25/2024    ALKPHO 83 01/25/2024    BILT 0.3 01/25/2024    TP 7.3 01/25/2024    AST 15 01/25/2024    ALT 10 01/25/2024    TSH 1.640 01/17/2022    SABA 55 08/02/2017    LIP 34 01/25/2024       Imaging:  CT ABDOMEN PELVIS IV CONTRAST, NO ORAL (ER)    Result Date: 1/26/2024  PROCEDURE: CT ABDOMEN PELVIS IV CONTRAST NO ORAL (ER)  COMPARISON: [facility],  ABDOMEN COMPLETE (CPT=76700), 11/23/2022, 9:12 AM.  INDICATIONS: abdominal pain  TECHNIQUE: CT images of the abdomen and pelvis were obtained with non-ionic intravenous contrast material.  Automated exposure control for dose reduction was used. Adjustment of the mA and/or kV was done based on the patient's size. Use of iterative reconstruction technique for dose reduction was used.  Dose information is transmitted to the ACR (American College of Radiology) NRDR (National Radiology Data Registry) which includes the Dose Index Registry.  FINDINGS:  LUNG BASES: No focal consolidation. LIVER: No enlargement, atrophy, abnormal density, or significant focal lesion.  SPLEEN: No enlargement or focal lesion.  GALLBLADDER: Mild gallbladder distension with small gallstones.  No significant gallbladder wall thickening. PANCREAS: No lesion, fluid collection, ductal dilatation, or atrophy.  ADRENALS: No defined mass or abnormal enlargement.  KIDNEYS: Enhance symmetrically without hydronephrosis. AORTA/VASCULAR:   Abdominal aorta is normal in caliber. ABDOMINAL NODES: No mass or adenopathy.  BOWEL/MESENTERY:  No dilated loops of bowel or definite abnormal bowel wall thickening.  Status post appendectomy.  There is colonic diverticulosis.  ABDOMINAL WALL: Unremarkable. URINARY BLADDER: Circumferential  urinary bladder wall thickening. PELVIC NODES: No adenopathy.   PELVIC ORGANS: Fibroid uterus. BONES:   Spondylosis lower thoracic and lumbar spine. OTHER: Negative.          CONCLUSION:   Cholelithiasis with mild gallbladder distension.  Consider correlation with ultrasound of the gallbladder.  Appendectomy.  Colonic diverticulosis.  Circumferential urinary bladder wall thickening which may relate to lack of distension.  If there is clinical concern for cystitis, correlation with urinalysis can be performed.  Fibroid uterus.  A preliminary report was issued by the [facility] teleradiology service. There are no major discrepancies.    Dictated by (CST): Jasiel Akers MD on 1/26/2024 at 7:37 AM     Finalized by (CST): Jasiel Akers MD on 1/26/2024 at 7:41 AM          US GALLBLADDER (CPT=76705)    Result Date: 1/26/2024  PROCEDURE: US GALLBLADDER (CPT=76705)  COMPARISON: [facility], US ABDOMEN COMPLETE (CPT=76700), 11/23/2022, 9:12 AM.  [facility], US GALLBLADDER (CPT=76705), 8/04/2017, 7:33 AM.  INDICATIONS: Abdominal pain  TECHNIQUE:   The gallbladder was evaluated with grayscale ultrasound.   FINDINGS:  GALLBLADDER:   Gallstones noted.  Gallbladder wall is not significantly thickened.  No pericholecystic fluid..  Sonographic Starr's sign was not elicited. BILE DUCTS:   Nondilated common bile duct measures 0.4 cm. OTHER:   Visualized pancreas unremarkable.  Liver measures 14.6 cm in length with increased echogenicity and probable areas of focal fatty sparing near the gallbladder fossa.  Right kidney measures 10.2 cm in length and is unremarkable.         CONCLUSION:   Cholelithiasis.  No evidence of gallbladder wall thickening or pericholecystic fluid.  Negative sonographic Starr sign.  Hepatic steatosis.  A preliminary report was issued by the [facility] service. There are no major discrepancies.    Dictated by (CST): Oswald  Jasiel RAMIREZ MD on 1/26/2024 at 6:43 AM     Finalized by (CST): Jasiel Akers MD on 1/26/2024 at 6:45 AM            Impression and Plan:  Patient Active Problem List   Diagnosis    Encounter for well woman exam with routine gynecological exam    TMJ (sprain of temporomandibular joint)    Impaired fasting glucose    Internal hemorrhoids    Vaginal discharge    Menorrhagia with regular cycle    Hot flashes    Vulvovaginitis due to yeast    Intramural uterine fibroid    Perimenopause    RUQ pain   +cholelithiasis   possible cholecystitis    CBD neg  LFT's ok   For LC        MICHAEL ALVAREZ MD  1/26/2024  9:49 AMConsults

## 2024-01-26 NOTE — ANESTHESIA PREPROCEDURE EVALUATION
Anesthesia PreOp Note    HPI:     Sussy Mckay is a 48 year old female who presents for preoperative consultation requested by: Erik Moran MD    Date of Surgery: 1/25/2024 - 1/26/2024    Procedure(s):  LAPAROSCOPIC CHOLECYSTECTOMY  Indication: Cholecystitis [K81.9]    Relevant Problems   No relevant active problems       NPO:  Last Liquid Consumption Date: 01/25/24     Last Solid Consumption Date: 01/25/24     Last Liquid Consumption Date: 01/25/24          History Review:  Patient Active Problem List    Diagnosis Date Noted    RUQ pain 01/25/2024    Perimenopause 01/17/2022    Intramural uterine fibroid 03/06/2020    Vaginal discharge 02/17/2020    Menorrhagia with regular cycle 02/17/2020    Hot flashes 02/17/2020    Vulvovaginitis due to yeast 02/17/2020    Internal hemorrhoids 09/08/2017    Encounter for well woman exam with routine gynecological exam 04/13/2017    TMJ (sprain of temporomandibular joint) 04/13/2017    Impaired fasting glucose 04/13/2017       Past Medical History:   Diagnosis Date    Miscarriage        Past Surgical History:   Procedure Laterality Date    ANESTH,VAGINAL DELIVERY      APPENDECTOMY      COLONOSCOPY N/A 9/8/2017    Procedure: COLONOSCOPY;  Surgeon: Sheeba Castro MD;  Location: [facility] ENDOSCOPY       Facility-Administered Medications Prior to Admission   Medication Dose Route Frequency Provider Last Rate Last Admin    medroxyPROGESTERone Acetate ILEANA 150 mg  150 mg Intramuscular Q3 Months Bull Walker MD   150 mg at 12/09/23 1001     Medications Prior to Admission   Medication Sig Dispense Refill Last Dose    Spacer/Aero-Hold Chamber Mask Does not apply Misc Use with inhaler (Patient not taking: Reported on 12/18/2023) 1 each 0 Not Taking    guaiFENesin-codeine 100-10 MG/5ML Oral Solution Take 5 mL by mouth every 6 (six) hours as needed for cough. (Patient not taking: Reported on 12/18/2023) 118 mL 0 Not Taking    cyclobenzaprine 5 MG Oral  Tab Take 1 tablet (5 mg total) by mouth 3 (three) times daily as needed. (Patient not taking: Reported on 12/18/2023) 30 tablet 0 Not Taking    ergocalciferol 1.25 MG (25518 UT) Oral Cap  (Patient not taking: Reported on 12/18/2023)       famotidine 40 MG Oral Tab Take 1 tablet (40 mg total) by mouth daily. (Patient not taking: Reported on 1/23/2023) 60 tablet 0 Not Taking    Ferrous Sulfate 325 (65 Fe) MG Oral Tab Take 1 tablet (325 mg total) by mouth 2 (two) times a day. (Patient not taking: Reported on 1/23/2023) 60 tablet 3 Not Taking    folic acid 1 MG Oral Tab Take 1 tablet (1 mg total) by mouth daily. (Patient not taking: Reported on 1/23/2023) 90 tablet 1 Not Taking     Current Facility-Administered Medications Ordered in Epic   Medication Dose Route Frequency Provider Last Rate Last Admin    [MAR Hold] morphINE PF 2 MG/ML injection 2 mg  2 mg Intravenous Q2H PRN Monica Agosto MD        Or    [MAR Hold] morphINE PF 4 MG/ML injection 4 mg  4 mg Intravenous Q2H PRN Monica Agosto MD        [MAR Hold] hydrALAzine (Apresoline) 20 mg/mL injection 10 mg  10 mg Intravenous Q4H PRN Monica Agosto MD        dextrose 5%-sodium chloride 0.45% infusion   Intravenous Continuous Monica Agosto  mL/hr at 01/26/24 0203 New Bag at 01/26/24 0203    [MAR Hold] famotidine (Pepcid) 20 mg/2mL injection 20 mg  20 mg Intravenous BID Monica Agosto MD   20 mg at 01/26/24 0923    [MAR Hold] ondansetron (Zofran) 4 MG/2ML injection 4 mg  4 mg Intravenous Once Cristian Dailey MD         No current [facility]-ordered outpatient medications on file.       Allergies   Allergen Reactions    Shrimp HIVES and Tightness in Throat     Confirmed allergy with 10/8/18 Adult Allergy Food Profile    Penicillins SWELLING       Family History   Problem Relation Age of Onset    Other (Other) Father     Diabetes Mother     Hypertension Mother     Cancer Maternal Grandmother         pancreatic 85     Social History     Socioeconomic History     Marital status:    Tobacco Use    Smoking status: Never    Smokeless tobacco: Never   Substance and Sexual Activity    Alcohol use: Yes     Comment: Occ 1-2 per year    Drug use: No       Available pre-op labs reviewed.  Lab Results   Component Value Date    WBC 11.8 (H) 01/25/2024    RBC 4.62 01/25/2024    HGB 11.0 (L) 01/26/2024    HCT 35.3 01/26/2024    MCV 81.6 01/25/2024    MCH 25.3 (L) 01/25/2024    MCHC 31.0 01/25/2024    RDW 15.1 (H) 01/25/2024    .0 01/25/2024    URINEPREG Negative 01/25/2024     Lab Results   Component Value Date     01/25/2024    K 4.0 01/25/2024     01/25/2024    CO2 24.0 01/25/2024    BUN 7 (L) 01/25/2024    CREATSERUM 0.89 01/25/2024     (H) 01/25/2024    CA 8.9 01/25/2024          Vital Signs:  Body mass index is 28.79 kg/m².   height is 1.651 m (5' 5\") and weight is 78.5 kg (173 lb). Her oral temperature is 98.2 °F (36.8 °C). Her blood pressure is 117/85 and her pulse is 75. Her respiration is 18 and oxygen saturation is 97%.   Vitals:    01/26/24 0149 01/26/24 0226 01/26/24 0627 01/26/24 0900   BP: (!) 151/99  121/76 117/85   Pulse: 69  75    Resp: 16  18 18   Temp: 98.1 °F (36.7 °C)  98.2 °F (36.8 °C)    TempSrc: Oral  Oral Oral   SpO2: 100%  99% 97%   Weight:  78.5 kg (173 lb)     Height:            Anesthesia Evaluation      Airway   Mallampati: II  TM distance: >3 FB  Neck ROM: full  Dental          Pulmonary - negative ROS and normal exam   Cardiovascular - negative ROS and normal exam    Neuro/Psych - negative ROS     GI/Hepatic/Renal - negative ROS     Endo/Other - negative ROS   Abdominal                  Anesthesia Plan:   ASA:  2  Plan:   General  Post-op Pain Management: IV analgesics  Plan Comments: I have discussed the anesthetic plan, major risks and alternatives with the patient and answered all questions. The patient desires to proceed with surgery and anesthesia as planned.     Informed Consent Plan and Risks Discussed With:   Patient      I have informed Sussy Smithanedbernardo Mckay and/or legal guardian or family member of the nature of the anesthetic plan, benefits, risks including possible dental damage if relevant, major complications, and any alternative forms of anesthetic management.   All of the patient's questions were answered to the best of my ability. The patient desires the anesthetic management as planned.  ELISEO CARRERA DO  1/26/2024 1:31 PM  Present on Admission:  **None**

## 2024-01-26 NOTE — ANESTHESIA PROCEDURE NOTES
Airway  Date/Time: 1/26/2024 1:55 PM  Urgency: Elective      General Information and Staff    Patient location during procedure: OR  Anesthesiologist: Grzegorz Lamar DO  Performed: anesthesiologist   Performed by: Grzegorz Lamar DO  Authorized by: Grzegorz Lamar DO      Indications and Patient Condition  Indications for airway management: anesthesia  Sedation level: deep  Preoxygenated: yes  Patient position: sniffing  Mask difficulty assessment: 1 - vent by mask    Final Airway Details  Final airway type: endotracheal airway      Successful airway: ETT  Cuffed: yes   Successful intubation technique: direct laryngoscopy  Endotracheal tube insertion site: oral  Blade type: Coleman.  Blade size: #3  ETT size (mm): 7.5    Placement verified by: capnometry   Measured from: lips  ETT to lips (cm): 21  Number of attempts at approach: 1  Number of other approaches attempted: 0

## 2024-01-26 NOTE — ED PROVIDER NOTES
Patient Seen in: [facility] Emergency Department      History     Chief Complaint   Patient presents with    Abdominal Pain     Stated Complaint: abdominal pain    Subjective:   HPI        Objective:   No pertinent past medical history.            No pertinent past surgical history.              No pertinent social history.            Review of Systems    Positive for stated complaint: abdominal pain  Other systems are as noted in HPI.  Constitutional and vital signs reviewed.      All other systems reviewed and negative except as noted above.    Physical Exam     ED Triage Vitals [01/25/24 2035]   BP (!) 160/95   Pulse 87   Resp 20   Temp 98.3 °F (36.8 °C)   Temp src Temporal   SpO2 100 %   O2 Device None (Room air)       Current:BP (!) 160/95   Pulse 87   Temp 98.3 °F (36.8 °C) (Temporal)   Resp 20   Ht 165.1 cm (5' 5\")   Wt 78.5 kg   SpO2 100%   BMI 28.79 kg/m²         Physical Exam          ED Course     Labs Reviewed   BASIC METABOLIC PANEL (8) - Abnormal; Notable for the following components:       Result Value    Glucose 114 (*)     BUN 7 (*)     BUN/CREA Ratio 7.9 (*)     All other components within normal limits   URINALYSIS, ROUTINE - Abnormal; Notable for the following components:    Blood Urine Trace (*)     Protein Urine Trace (*)     Squamous Epi. Cells Few (*)     All other components within normal limits   CBC W/ DIFFERENTIAL - Abnormal; Notable for the following components:    WBC 11.8 (*)     HGB 11.7 (*)     MCH 25.3 (*)     RDW 15.1 (*)     Neutrophil Absolute Prelim 8.68 (*)     Neutrophil Absolute 8.68 (*)     Eosinophil Absolute 0.88 (*)     All other components within normal limits   HEPATIC FUNCTION PANEL (7) - Normal   LIPASE - Normal   POCT PREGNANCY URINE - Normal   CBC WITH DIFFERENTIAL WITH PLATELET    Narrative:     The following orders were created for panel order CBC With Differential With Platelet.  Procedure                               Abnormality         Status                      ---------                               -----------         ------                     CBC W/ DIFFERENTIAL[[id number]]          Abnormal            Final result                 Please view results for these tests on the individual orders.          ED Course as of 01/25/24 0703  ------------------------------------------------------------  Time: 01/25 2315  Value: US GALLBLADDER (CPT=76705)  Comment: IMPRESSION:  Cholelithiasis without clear secondary findings to suggest cholecystitis    There are a few partially mobile gallstones within the gallbladder  Gallbladder is distended with maximum transverse dimension 5.4 cm  Nonspecific.  Could be seen with fasting state or in the setting of biliary dyskinesia  No gallbladder wall thickening  No appreciable pericholecystic fluid  Negative sonographic Starr's sign per report    Liver is diffusely echogenic suggesting steatosis  Probable focal fatty sparing nearthe gallbladder fossa    Visualized pancreas appears normal  Right kidney appears normal                MDM      48-year-old female with a history of fatty liver disease presents today for abdominal pain.  Patient states that she has had abdominal pain intermittently throughout the years.  For the last 3 months, she has had increased abdominal pain associated with some nausea and bloating.  Pain tends to be worse at night and after eating.  She was seen by her primary care doctor who gave her referral to gastroenterology with an appointment upcoming in March.  She is not taking any medications for the symptoms.  In the last 2 days, the pain has increased to 9 out of 10 in severity and is now constant.  Denies fevers, vomiting, diarrhea, bleeding, chest pain, or other associated symptoms.  Denies abdominal surgeries in the past.    On exam, slightly hypertensive, well-appearing, tender to palpation across the epigastrium with voluntary guarding.  Positive Starr sign.  Minimal  distention.    Differential: Cholecystitis, gastritis, peptic ulcer disease, pancreatitis    I performed and interpreted a bedside ultrasound of the gallbladder which did show cholelithiasis and a distended gallbladder.  No clear impacted stone in the neck.  CBD normal.  No gallbladder wall thickening.  Positive sonographic Starr's.    Plan to eval with labs including LFTs and lipase and radiology ultrasound to better clarify gallbladder pathology.    Labs showed a mild leukocytosis.  Radiology ultrasound with similar findings.    On reexam, continuing moderate to severe pain with a positive Starr sign.  I have concern for cholecystitis based on the physical exam.  Therefore, patient ordered for antibiotics and general surgery consult.  Will broaden workup with CTAP to rule out alternative pathology and admit.    Admission disposition: 1/25/2024 11:49 PM                                  Medical Decision Making      Disposition and Plan     Clinical Impression:  1. RUQ pain         Disposition:  Admit  1/25/2024 11:49 pm    Follow-up:  No follow-up provider specified.        Medications Prescribed:  Current Discharge Medication List                            Hospital Problems       Present on Admission  Date Reviewed: 12/18/2023            ICD-10-CM Noted POA    RUQ pain R10.11 1/25/2024 Unknown

## 2024-01-26 NOTE — H&P
[facility]    History & Physical    Sussy Mckay Patient Status:  Emergency    1975 MRN [id number]   Location [facility] EMERGENCY DEPARTMENT Attending Cristian Dailey MD   Hosp Day # 0 PCP JASWANT ORTIZ MD     Date:  2024  Date of Admission:  2024    Chief Complaint:  Chief Complaint   Patient presents with    Abdominal Pain       History of Present Illness:  Sussy Mckay is a(n) 48 year old female, with no significant past medical history presents with complaint of upper abdominal pain ongoing for the past few months.  Describes the pain as intermittent nature however more consistent over the last 2 days.  Claims her pain is associated with nausea and bloating aggravated by food with no relieving factors.  His pain tends to radiate towards the back.  Pain is described as sharp 9 out of 10 at its worst.  Denies any fever or chills denies any change in the color of her urine or stool.  Ultrasound done in ER shows evidence cholelithiasis with a distended gallbladder, CT of the abdomen indicating possible cystic duct stone    History:  Past Medical History:   Diagnosis Date    Miscarriage      Past Surgical History:   Procedure Laterality Date    ANESTH,VAGINAL DELIVERY      APPENDECTOMY      COLONOSCOPY N/A 2017    Procedure: COLONOSCOPY;  Surgeon: Sheeba Castro MD;  Location: [facility] ENDOSCOPY     Family History   Problem Relation Age of Onset    Other (Other) Father     Diabetes Mother     Hypertension Mother     Cancer Maternal Grandmother         pancreatic 85      reports that she has never smoked. She has never used smokeless tobacco. She reports current alcohol use. She reports that she does not use drugs.    Allergies:  Allergies   Allergen Reactions    Shrimp HIVES and Tightness in Throat     Confirmed allergy with 10/8/18 Adult Allergy Food Profile    Penicillins SWELLING       Home Medications:  Prior to  Admission Medications   Prescriptions Last Dose Informant Patient Reported? Taking?   Ferrous Sulfate 325 (65 Fe) MG Oral Tab Not Taking  No No   Sig: Take 1 tablet (325 mg total) by mouth 2 (two) times a day.   Patient not taking: Reported on 1/23/2023   Spacer/Aero-Hold Chamber Mask Does not apply Misc Not Taking  No No   Sig: Use with inhaler   Patient not taking: Reported on 12/18/2023   cyclobenzaprine 5 MG Oral Tab Not Taking  No No   Sig: Take 1 tablet (5 mg total) by mouth 3 (three) times daily as needed.   Patient not taking: Reported on 12/18/2023   ergocalciferol 1.25 MG (28549 UT) Oral Cap   Yes No   Patient not taking: Reported on 12/18/2023   famotidine 40 MG Oral Tab Not Taking  No No   Sig: Take 1 tablet (40 mg total) by mouth daily.   Patient not taking: Reported on 1/23/2023   folic acid 1 MG Oral Tab Not Taking  No No   Sig: Take 1 tablet (1 mg total) by mouth daily.   Patient not taking: Reported on 1/23/2023   guaiFENesin-codeine 100-10 MG/5ML Oral Solution Not Taking  No No   Sig: Take 5 mL by mouth every 6 (six) hours as needed for cough.   Patient not taking: Reported on 12/18/2023      Facility-Administered Medications Last Administration Doses Remaining   medroxyPROGESTERone Acetate ILEANA 150 mg 12/9/2023 10:01 AM           Review of Systems:  Constitutional:  Weakness, Fatigue.  Eye:  Negative.  Ear/Nose/Mouth/Throat:  Negative.  Respiratory:  Negative  Cardiovascular: Negative  Gastrointestinal: Abdominal pain, nausea  Genitourinary:  Negative  Endocrine:  Negative.  Immunologic:  Negative.  Musculoskeletal:  Negative.  Integumentary:  Negative.  Neurologic:  Negative.  Psychiatric:  Negative.  ROS reviewed as documented in chart    Physical Exam:  Temp:  [98.3 °F (36.8 °C)] 98.3 °F (36.8 °C)  Pulse:  [87] 87  Resp:  [20] 20  BP: (160)/(95) 160/95  SpO2:  [100 %] 100 %    General:  Alert and oriented.  Diffuse skin problem:  None.  Eye:  Pupils are equal, round and reactive to light,  extraocular movements are intact, Normal conjunctiva.  HENT:  Normocephalic, oral mucosa is moist.  Head:  Normocephalic, atraumatic.  Neck:  Supple, non-tender, no carotid bruit, no jugular venous distention, no lymphadenopathy, no thyromegaly.  Respiratory:  Lungs are clear to auscultation, respirations are non-labored, breath sounds are equal, symmetrical chest wall expansion.  Cardiovascular:  Normal rate, regular rhythm, no murmur, no edema.  Gastrointestinal:  Soft, right upper quadrant tenderness, non-distended, normal bowel sounds, no organomegaly.  Lymphatics:  No lymphadenopathy neck, axilla, groin.  Musculoskeletal: Normal range of motion.  normal strength.  Feet:  Normal pulses.  Neurologic:  Alert, oriented, no focal deficits, cranial nerves II-XII are grossly intact.  Cognition and Speech:  Oriented, speech clear and coherent.  Psychiatric:  Cooperative, appropriate mood & affect.      Laboratory Data:   Lab Results   Component Value Date    WBC 11.8 01/25/2024    HGB 11.7 01/25/2024    HCT 37.7 01/25/2024    .0 01/25/2024    CREATSERUM 0.89 01/25/2024    BUN 7 01/25/2024     01/25/2024    K 4.0 01/25/2024     01/25/2024    CO2 24.0 01/25/2024     01/25/2024    CA 8.9 01/25/2024    ALB 4.1 01/25/2024    ALKPHO 83 01/25/2024    BILT 0.3 01/25/2024    TP 7.3 01/25/2024    AST 15 01/25/2024    ALT 10 01/25/2024    LIP 34 01/25/2024       Imaging:  No results found.     Assessment and Plan:    Symptomatic cholelithiasis  Surgery consulted, patient will remain n.p.o. for now, use morphine as needed for pain, Zofran for nausea.  IV fluids initiated.  Consider need for HIDA    Prophylaxis  SCDs, heparin on hold till patient evaluated by surgery.    CODE STATUS  Full    Primary care physician  JASWANT ORTIZ MD    60 minutes spent on this admission - examining patient, obtaining history, reviewing previous medical records, going over test results/imaging and discussing plan of  care. All questions answered.     Disposition  Clinical course will dictate outcome      RHETT BOWIE MD  1/25/2024  11:52 PM

## 2024-01-26 NOTE — ANESTHESIA POSTPROCEDURE EVALUATION
Patient: Sussy Mock Mckay    Procedure Summary       Date: 01/26/24 Room / Location: [facility] MAIN OR  / [facility] MAIN OR    Anesthesia Start: 1347 Anesthesia Stop: 1453    Procedure: LAPAROSCOPIC CHOLECYSTECTOMY (Abdomen) Diagnosis:       Cholecystitis      (Cholecystitis [K81.9])    Surgeons: Erik Moran MD Anesthesiologist: Grzegorz Lamar DO    Anesthesia Type: general ASA Status: 2            Anesthesia Type: general    Vitals Value Taken Time   /84 01/26/24 1454   Temp 97.5 °F (36.4 °C) 01/26/24 1453   Pulse 92 01/26/24 1455   Resp 24 01/26/24 1455   SpO2 94 % 01/26/24 1455   Vitals shown include unfiled device data.    EM AN Post Evaluation:   Patient Evaluated in PACU  Patient Participation: complete - patient participated  Level of Consciousness: awake  Pain Management: adequate  Airway Patency:patent  Dental exam unchanged from preop  Yes    Cardiovascular Status: acceptable  Respiratory Status: acceptable  Postoperative Hydration acceptable      GRZEGORZ LAMAR DO  1/26/2024 2:56 PM

## 2024-01-27 VITALS
WEIGHT: 173 LBS | SYSTOLIC BLOOD PRESSURE: 123 MMHG | BODY MASS INDEX: 28.82 KG/M2 | DIASTOLIC BLOOD PRESSURE: 89 MMHG | HEART RATE: 79 BPM | OXYGEN SATURATION: 96 % | HEIGHT: 65 IN | RESPIRATION RATE: 18 BRPM | TEMPERATURE: 98 F

## 2024-01-27 PROBLEM — K81.9 CHOLECYSTITIS: Status: ACTIVE | Noted: 2024-01-27

## 2024-01-27 LAB
ALBUMIN SERPL-MCNC: 3.9 G/DL (ref 3.2–4.8)
ALBUMIN/GLOB SERPL: 1.4 {RATIO} (ref 1–2)
ALP LIVER SERPL-CCNC: 70 U/L
ALT SERPL-CCNC: 74 U/L
AMYLASE SERPL-CCNC: 50 U/L (ref 30–118)
ANION GAP SERPL CALC-SCNC: 9 MMOL/L (ref 0–18)
AST SERPL-CCNC: 76 U/L (ref ?–34)
BASOPHILS # BLD AUTO: 0.02 X10(3) UL (ref 0–0.2)
BASOPHILS NFR BLD AUTO: 0.1 %
BILIRUB DIRECT SERPL-MCNC: 0.3 MG/DL (ref ?–0.3)
BILIRUB SERPL-MCNC: 0.6 MG/DL (ref 0.3–1.2)
BUN BLD-MCNC: <5 MG/DL (ref 9–23)
CALCIUM BLD-MCNC: 8.7 MG/DL (ref 8.7–10.4)
CHLORIDE SERPL-SCNC: 110 MMOL/L (ref 98–112)
CO2 SERPL-SCNC: 20 MMOL/L (ref 21–32)
CREAT BLD-MCNC: 0.65 MG/DL
DEPRECATED RDW RBC AUTO: 44.1 FL (ref 35.1–46.3)
EGFRCR SERPLBLD CKD-EPI 2021: 108 ML/MIN/1.73M2 (ref 60–?)
EOSINOPHIL # BLD AUTO: 0 X10(3) UL (ref 0–0.7)
EOSINOPHIL NFR BLD AUTO: 0 %
ERYTHROCYTE [DISTWIDTH] IN BLOOD BY AUTOMATED COUNT: 15.1 % (ref 11–15)
GLOBULIN PLAS-MCNC: 2.8 G/DL (ref 2.8–4.4)
GLUCOSE BLD-MCNC: 154 MG/DL (ref 70–99)
HCT VFR BLD AUTO: 34.9 %
HGB BLD-MCNC: 10.9 G/DL
IMM GRANULOCYTES # BLD AUTO: 0.07 X10(3) UL (ref 0–1)
IMM GRANULOCYTES NFR BLD: 0.5 %
LIPASE SERPL-CCNC: 27 U/L (ref 12–53)
LYMPHOCYTES # BLD AUTO: 0.67 X10(3) UL (ref 1–4)
LYMPHOCYTES NFR BLD AUTO: 4.9 %
MCH RBC QN AUTO: 25.5 PG (ref 26–34)
MCHC RBC AUTO-ENTMCNC: 31.2 G/DL (ref 31–37)
MCV RBC AUTO: 81.5 FL
MONOCYTES # BLD AUTO: 0.82 X10(3) UL (ref 0.1–1)
MONOCYTES NFR BLD AUTO: 6 %
NEUTROPHILS # BLD AUTO: 12.14 X10 (3) UL (ref 1.5–7.7)
NEUTROPHILS # BLD AUTO: 12.14 X10(3) UL (ref 1.5–7.7)
NEUTROPHILS NFR BLD AUTO: 88.5 %
PLATELET # BLD AUTO: 265 10(3)UL (ref 150–450)
POTASSIUM SERPL-SCNC: 3.9 MMOL/L (ref 3.5–5.1)
PROT SERPL-MCNC: 6.7 G/DL (ref 5.7–8.2)
RBC # BLD AUTO: 4.28 X10(6)UL
SODIUM SERPL-SCNC: 139 MMOL/L (ref 136–145)
WBC # BLD AUTO: 13.7 X10(3) UL (ref 4–11)

## 2024-01-27 PROCEDURE — 99239 HOSP IP/OBS DSCHRG MGMT >30: CPT | Performed by: HOSPITALIST

## 2024-01-27 RX ORDER — HYDROCODONE BITARTRATE AND ACETAMINOPHEN 5; 325 MG/1; MG/1
1 TABLET ORAL EVERY 6 HOURS PRN
Qty: 20 TABLET | Refills: 0 | Status: SHIPPED | OUTPATIENT
Start: 2024-01-27

## 2024-01-27 NOTE — PLAN OF CARE
Patient is alert and oriented x4. Vitals are stable and patient is on room air. Patient is on a advance as tolerated currently on regular diet and tolerating well. Patient ambulates independently. Patient denies nausea, vomiting, diarrhea and shortness of breath. Patient did complain of abdominal pain  prn oral oxy10 administered and it was effective. Patient is on IVF and IV antibiotics. Patient is voiding and last had a bowel movement on 1/25/24. Patient has 4 lap sites, dressings are clean, dry and intact. Patient is belching however not passing gas. Safety measures are in place. Family remained at the bedside . Pt will be going home with follow up scheduled with doctor sim in a week.  Problem: Patient Centered Care  Goal: Patient preferences are identified and integrated in the patient's plan of care  Description: Interventions:  - What would you like us to know as we care for you? I am from home w/ my .   - Provide timely, complete, and accurate information to patient/family  - Incorporate patient and family knowledge, values, beliefs, and cultural backgrounds into the planning and delivery of care  - Encourage patient/family to participate in care and decision-making at the level they choose  - Honor patient and family perspectives and choices  1/27/2024 1134 by Talita Dillon, RN  Outcome: Adequate for Discharge  1/27/2024 1134 by Talita Dillon, RN  Outcome: Progressing

## 2024-01-27 NOTE — PROGRESS NOTES
[facility], [place]    Progress Note    Sussy Sal Mckay Patient Status:  Inpatient    1975 MRN [id number]   Location [facility] 4W/SW/SE Attending Lupe Muro, DO   Hosp Day # 1 PCP JASWANT ORTIZ MD     Assessment and Plan:     Postop day 1 after laparoscopic cholecystectomy.  -Advance diet as tolerated.  Discharge home per hospitalist.  Follow-up Dr. Carpenter 1 week    Subjective:   Some incisional tenderness    Objective:   Patient Vitals for the past 24 hrs:   BP Temp Temp src Pulse Resp SpO2   24 0402 118/84 98.3 °F (36.8 °C) Oral 91 16 96 %   24 1937 128/81 97.9 °F (36.6 °C) Oral 84 18 94 %   24 1746 -- 97.7 °F (36.5 °C) Axillary -- -- --   24 1635 126/86 (!) 111.2 °F (44 °C) Oral -- 18 --   24 1623 133/90 98.1 °F (36.7 °C) Temporal 72 17 95 %   24 1613 128/87 -- -- 77 23 96 %   24 1603 129/77 -- -- 91 18 97 %   24 1553 122/84 -- -- 71 18 97 %   24 1543 129/81 -- -- 72 18 97 %   24 1533 126/84 -- -- 71 19 97 %   24 1523 123/88 -- -- 71 17 98 %   24 1513 130/85 -- -- 72 18 97 %   24 1503 132/84 -- -- 85 19 98 %   24 1453 139/84 97.5 °F (36.4 °C) Temporal 93 16 94 %   24 1334 120/83 98.6 °F (37 °C) Oral 72 16 96 %   24 0900 117/85 -- Oral -- 18 97 %       Intake/Output                   24 0700 - 24 0659 24 0700 - 24 0659 24 0700 - 24 0659       Intake    P.O.  0  --  --    P.O. 0 -- --    I.V.  1000  1368.8  915.1    Volume (mL) (lactated ringers infusion) -- 600 --    Volume (mL) (lactated ringers infusion) -- 150 --    Volume (mL) (potassium chloride 20 mEq in dextrose 5%-sodium chloride 0.45% 1000mL infusion premix) -- -- 915.1    Volume (mL) (sodium chloride 0.9 % IV bolus 1,000 mL) 1000 -- --    Volume (mL) (dextrose 5%-sodium chloride 0.45% infusion) -- 618.8 --    IV PIGGYBACK  100  --  50    Volume (mL) (clindamycin phosphate in  NaCl 0.9% (Cleocin) 900 mg/50mL IVPB premix 900 mg) -- -- 50    Volume (mL) (metRONIDAZOLE in sodium chloride 0.79% (Flagyl) 5 mg/mL IVPB premix 500 mg) 100 -- --    Total Intake 1100 1368.8 965.1       Output    Urine  --  1100  --    Urine -- 1100 --    Urine Occurrence 1 x 1 x --    Incontinent Urine Occurrence -- 0 x --    Blood  --  5  --    Blood Output -- 5 --    Total Output -- 1105 --       Net I/O     1100 263.8 965.1            Exam: Abdomen is soft, slightly tender    Results:     Lab Results   Component Value Date    WBC 13.7 (H) 01/27/2024    HGB 10.9 (L) 01/27/2024    HCT 34.9 (L) 01/27/2024    .0 01/27/2024    CREATSERUM 0.65 01/27/2024    BUN <5 (L) 01/27/2024     01/27/2024    K 3.9 01/27/2024     01/27/2024    CO2 20.0 (L) 01/27/2024     (H) 01/27/2024    CA 8.7 01/27/2024    ALB 3.9 01/27/2024    ALKPHO 70 01/27/2024    BILT 0.6 01/27/2024    TP 6.7 01/27/2024    AST 76 (H) 01/27/2024    ALT 74 (H) 01/27/2024    TSH 1.640 01/17/2022    SABA 50 01/27/2024    LIP 27 01/27/2024       CT ABDOMEN PELVIS IV CONTRAST, NO ORAL (ER)    Result Date: 1/26/2024  CONCLUSION:   Cholelithiasis with mild gallbladder distension.  Consider correlation with ultrasound of the gallbladder.  Appendectomy.  Colonic diverticulosis.  Circumferential urinary bladder wall thickening which may relate to lack of distension.  If there is clinical concern for cystitis, correlation with urinalysis can be performed.  Fibroid uterus.  A preliminary report was issued by the [facility] teleradiology service. There are no major discrepancies.    Dictated by (CST): Jasiel Akers MD on 1/26/2024 at 7:37 AM     Finalized by (CST): Jasiel Akers MD on 1/26/2024 at 7:41 AM          US GALLBLADDER (CPT=76705)    Result Date: 1/26/2024  CONCLUSION:   Cholelithiasis.  No evidence of gallbladder wall thickening or pericholecystic fluid.  Negative sonographic Starr sign.  Hepatic steatosis.  A  preliminary report was issued by the [facility] teleradiology service. There are no major discrepancies.    Dictated by (CST): Jasiel Akers MD on 1/26/2024 at 6:43 AM     Finalized by (CST): Jasiel Akers MD on 1/26/2024 at 6:45 AM                   Gonzalo Correia MD  1/27/2024

## 2024-01-27 NOTE — PLAN OF CARE
Patient is alert and oriented x4. Vitals are stable and patient is on room air. Patient is on a clear liquid diet and tolerating well. Patient ambulates independently. Patient denies nausea, vomiting, diarrhea and shortness of breath. Patient did complain of abdominal pain 4-7/10, prn oral oxy IR 5mg x1 and prn ivp dilaudid 0.8mg x1 were administered and were effective. Patient is on IVF and IV antibiotics. Patient is voiding and last had a bowel movement on 1/25/24. Patient has 4 lap sites, dressings are clean, dry and intact. Patient is belching however not passing gas. Safety measures are in place. Lovenox started at 0600. Family remained at the bedside overnight.     Problem: Patient Centered Care  Goal: Patient preferences are identified and integrated in the patient's plan of care  Description: Interventions:  - What would you like us to know as we care for you? I am from home w/ my .   - Provide timely, complete, and accurate information to patient/family  - Incorporate patient and family knowledge, values, beliefs, and cultural backgrounds into the planning and delivery of care  - Encourage patient/family to participate in care and decision-making at the level they choose  - Honor patient and family perspectives and choices  Outcome: Progressing     Problem: Patient/Family Goals  Goal: Patient/Family Long Term Goal  Description: Patient's Long Term Goal: Discharge home    Interventions:  - IVF  - Monitor vitals  - Monitor labs  - Monitor lap sites  - IV antibiotics  - Manage pain  - Advance diet as indicated  - Tolerate advancement of diet  - Promoting ambulating  - Lovenox subcutaneous  - SCDs  - See additional Care Plan goals for specific interventions  Outcome: Progressing  Goal: Patient/Family Short Term Goal  Description: Patient's Short Term Goal: Manage pain     Interventions:   - Frequent pain assessments  - Non-pharmacological interventions  - Pain medications as needed/indicated  - Early  ambulation if clinically appropriate  - See additional Care Plan goals for specific interventions  Outcome: Progressing     Problem: PAIN - ADULT  Goal: Verbalizes/displays adequate comfort level or patient's stated pain goal  Description: INTERVENTIONS:  - Encourage pt to monitor pain and request assistance  - Assess pain using appropriate pain scale  - Administer analgesics based on type and severity of pain and evaluate response  - Implement non-pharmacological measures as appropriate and evaluate response  - Consider cultural and social influences on pain and pain management  - Manage/alleviate anxiety  - Utilize distraction and/or relaxation techniques  - Monitor for opioid side effects  - Notify MD/LIP if interventions unsuccessful or patient reports new pain  - Anticipate increased pain with activity and pre-medicate as appropriate  Outcome: Progressing     Problem: RISK FOR INFECTION - ADULT  Goal: Absence of fever/infection during anticipated neutropenic period  Description: INTERVENTIONS  - Monitor WBC  - Administer growth factors as ordered  - Implement neutropenic guidelines  Outcome: Progressing     Problem: DISCHARGE PLANNING  Goal: Discharge to home or other facility with appropriate resources  Description: INTERVENTIONS:  - Identify barriers to discharge w/pt and caregiver  - Include patient/family/discharge partner in discharge planning  - Arrange for needed discharge resources and transportation as appropriate  - Identify discharge learning needs (meds, wound care, etc)  - Arrange for interpreters to assist at discharge as needed  - Consider post-discharge preferences of patient/family/discharge partner  - Complete POLST form as appropriate  - Assess patient's ability to be responsible for managing their own health  - Refer to Case Management Department for coordinating discharge planning if the patient needs post-hospital services based on physician/LIP order or complex needs related to functional  status, cognitive ability or social support system  Outcome: Progressing     Problem: GASTROINTESTINAL - ADULT  Goal: Maintains or returns to baseline bowel function  Description: INTERVENTIONS:  - Assess bowel function  - Maintain adequate hydration with IV or PO as ordered and tolerated  - Evaluate effectiveness of GI medications  - Encourage mobilization and activity  - Obtain nutritional consult as needed  - Establish a toileting routine/schedule  - Consider collaborating with pharmacy to review patient's medication profile  Outcome: Progressing     Problem: GENITOURINARY - ADULT  Goal: Absence of urinary retention  Description: INTERVENTIONS:  - Assess patient’s ability to void and empty bladder  - Monitor intake/output and perform bladder scan as needed  - Follow urinary retention protocol/standard of care  - Consider collaborating with pharmacy to review patient's medication profile  - Implement strategies to promote bladder emptying  Outcome: Progressing     Problem: SKIN/TISSUE INTEGRITY - ADULT  Goal: Incision(s), wounds(s) or drain site(s) healing without S/S of infection  Description: INTERVENTIONS:  - Assess and document risk factors for pressure ulcer development  - Assess and document skin integrity  - Assess and document dressing/incision, wound bed, drain sites and surrounding tissue  - Implement wound care per orders  - Initiate isolation precautions as appropriate  - Initiate Pressure Ulcer prevention bundle as indicated  Outcome: Progressing

## 2024-01-27 NOTE — DISCHARGE SUMMARY
[facility]    Discharge Summary    Sussy Mckay Patient Status:  Inpatient    1975 MRN [id number]   Location [facility] 4W/SW/SE Attending Lupe Muro DO   Hosp Day # 1 PCP JASWANT ORTIZ MD     Date of Admission: 2024 Disposition: Home or Self Care     Date of Discharge: 2024      Admitting Diagnosis: RUQ pain [R10.11]    Hospital Discharge Diagnoses:  cholecystitis     Hospital Discharge Diagnoses:  cholecystitis     Lace+ Score: 29  59-90 High Risk  29-58 Medium Risk  0-28   Low Risk.    TCM Follow-Up Recommendation:  LACE 29-58: Moderate Risk of readmission after discharge from the hospital.          Lace+ Score: 29  59-90 High Risk  29-58 Medium Risk  0-28   Low Risk    Risk of readmission: Sussy Mckay has Moderate Risk of readmission after discharge from the hospital.    Problem List:   Patient Active Problem List   Diagnosis    Encounter for well woman exam with routine gynecological exam    TMJ (sprain of temporomandibular joint)    Impaired fasting glucose    Internal hemorrhoids    Vaginal discharge    Menorrhagia with regular cycle    Hot flashes    Vulvovaginitis due to yeast    Intramural uterine fibroid    Perimenopause    RUQ pain       Reason for Admission: abd pain     Physical Exam:   General appearance: alert, appears stated age and cooperative  Pulmonary:  clear to auscultation bilaterally  Cardiovascular: S1, S2 normal, no murmur, click, rub or gallop, regular rate and rhythm  Abdominal: soft, non-tender; bowel sounds normal; no masses,  no organomegaly  Extremities: extremities normal, atraumatic, no cyanosis or edema  Psychiatric: calm        History of Present Illness: History of Present Illness:  Sussy Mckay is a(n) 48 year old female, with no significant past medical history presents with complaint of upper abdominal pain ongoing for the past few months.  Describes the pain as  intermittent nature however more consistent over the last 2 days.  Claims her pain is associated with nausea and bloating aggravated by food with no relieving factors.  His pain tends to radiate towards the back.  Pain is described as sharp 9 out of 10 at its worst.  Denies any fever or chills denies any change in the color of her urine or stool.  Ultrasound done in ER shows evidence cholelithiasis with a distended gallbladder, CT of the abdomen indicating possible cystic duct stone    Hospital Course:       Symptomatic cholelithiasis  Surgery consulted, patient will remain n.p.o. for now, use morphine as needed for pain, Zofran for nausea.  IV fluids initiated.   - abx   - ivfs   - apprec surg consult - surgery today      Prophylaxis  SCDs, heparin on hold till patient evaluated by surgery.     CODE STATUS  Full     Primary care physician  JASWANT ORTIZ MD                    Chart reviewed, including current vitals, notes, labs and imaging  Labs ordered and medications adjusted as outlined above  Coordinate care with care team/consultants  Discussed with patient results of tests, management plan as outlined above, and the need for ongoing hospitalization  D/w RN     [facility]            Consultations: Dr Friedman     Procedures: lap rose mary     Complications: none    Discharge Condition: Good    Discharge Medications:      Discharge Medications        START taking these medications        Instructions Prescription details   HYDROcodone-acetaminophen 5-325 MG Tabs  Commonly known as: Norco      Take 1 tablet by mouth every 6 (six) hours as needed for Pain.   Quantity: 20 tablet  Refills: 0            CONTINUE taking these medications        Instructions Prescription details   Spacer/Aero-Hold Chamber Mask Misc  Notes to patient: Take how prescribed      Use with inhaler   Quantity: 1 each  Refills: 0            STOP taking these medications      cyclobenzaprine 5 MG Tabs  Commonly known as: Flexeril         ergocalciferol 1.25 MG (08583 UT) Caps  Commonly known as: Vitamin D2        famotidine 40 MG Tabs  Commonly known as: Pepcid        Ferrous Sulfate 325 (65 Fe) MG Tabs        folic acid 1 MG Tabs  Commonly known as: Folvite        guaiFENesin-codeine 100-10 MG/5ML Soln  Commonly known as: Robitussin AC                  Where to Get Your Medications        These medications were sent to [id number] - [place], [place] - [address] AT [facility], [phone], [phone]  [address] [facility] [address]      Phone: [phone]   HYDROcodone-acetaminophen 5-325 MG Tabs         Follow up Visits: Follow-up with Dr Friedman  in 1 week    Follow up Labs: none     Other Discharge Instructions: none    Lupe Muro DO  1/27/2024  3:30 PM    > 35 min

## 2024-01-27 NOTE — PLAN OF CARE
POD #0 of a Lab cholecystectomy . Pt is aox 4, resting in the bed. Sleepy,  Check voiding . SCD and Lovenox  for DVT prophylaxis. Abdominal dressings 4 x CDI. Oxy prn for pain. Clear liquid diet tolerated well. No N/V. Pt plans to d/c home . Disease process discussed with pt. Family by the bed side. Bed in lowest position, call light and personal possessions within reach. Pt instructed to call for assistance before getting up.

## 2024-01-29 ENCOUNTER — PATIENT OUTREACH (OUTPATIENT)
Dept: CASE MANAGEMENT | Age: 49
End: 2024-01-29

## 2024-03-02 ENCOUNTER — NURSE ONLY (OUTPATIENT)
Dept: FAMILY MEDICINE CLINIC | Facility: CLINIC | Age: 49
End: 2024-03-02
Payer: COMMERCIAL

## 2024-03-02 ENCOUNTER — TELEPHONE (OUTPATIENT)
Dept: FAMILY MEDICINE CLINIC | Facility: CLINIC | Age: 49
End: 2024-03-02

## 2024-03-02 DIAGNOSIS — Z30.42 ENCOUNTER FOR DEPO-PROVERA CONTRACEPTION: Primary | ICD-10-CM

## 2024-03-02 PROCEDURE — 96372 THER/PROPH/DIAG INJ SC/IM: CPT | Performed by: NURSE PRACTITIONER

## 2024-03-02 RX ORDER — MEDROXYPROGESTERONE ACETATE 150 MG/ML
150 INJECTION, SUSPENSION INTRAMUSCULAR
Status: SHIPPED | OUTPATIENT
Start: 2024-03-02

## 2024-03-02 RX ADMIN — MEDROXYPROGESTERONE ACETATE 150 MG: 150 INJECTION, SUSPENSION INTRAMUSCULAR at 10:27:00

## 2024-03-02 NOTE — PROGRESS NOTES
Full name and  verified with patient.     Patient is here for Depo 150 mg injection. Patient tolerated well no signs of adverse reaction.     Next  Depo window: May 18 -     Patient was informed she is due for physical.

## 2024-03-02 NOTE — TELEPHONE ENCOUNTER
Order for depo placed.   NO additional depo will be given until pt comes in for ps and labs.   
Patient is coming in today for a Depo shot, no active order on file. Patient last physical was on 01/17/2022 with Dr. Walker.     Would you approve a Depo shot for today?    Please advs. KK    
normal

## 2024-04-29 ENCOUNTER — OFFICE VISIT (OUTPATIENT)
Dept: FAMILY MEDICINE CLINIC | Facility: CLINIC | Age: 49
End: 2024-04-29
Payer: COMMERCIAL

## 2024-04-29 VITALS
BODY MASS INDEX: 30 KG/M2 | HEART RATE: 78 BPM | DIASTOLIC BLOOD PRESSURE: 73 MMHG | WEIGHT: 178.63 LBS | SYSTOLIC BLOOD PRESSURE: 122 MMHG

## 2024-04-29 DIAGNOSIS — Z12.11 COLON CANCER SCREENING: ICD-10-CM

## 2024-04-29 DIAGNOSIS — Z00.00 PHYSICAL EXAM: Primary | ICD-10-CM

## 2024-04-29 NOTE — PROGRESS NOTES
4/29/2024  4:27 PM    Sussy Mckay is a 48 year old female.    Chief complaint(s):   Chief Complaint   Patient presents with    Routine Physical     HPI:     Sussy Mckay primary complaint is regarding CPE.     Sussy Mckay is a 48 year old female present today for a routine periodic health gynecological screening and/or complete physical examination.  Her last physical exam was 2 year(s) ago. No LMP recorded (lmp unknown). Patient has had an injection.   Menarche occurred at age15 .  She is currently using  none as a form of contraception.    Sussy Mckay is G 4, P2, Ab 2.   She has a history of veneral infection significant for none.   She performs breast self-exams occatioanlly .  Her last TDAP (orTD) booster was 5 years ago.  She is current with her influenza immunization. Already received her COVID vaccines.  Her last Pap smear was 2 yrs ago.  Her last mammogram was 2 year(s) ago and was normal.  Regarding colon cancer  screening test she underwent colonoscopy 7 year(s) ago, findings were normal.   None smoker.      HISTORY:  Past Medical History:    Miscarriage (HCC)      Past Surgical History:   Procedure Laterality Date    Anesth,vaginal delivery      Appendectomy      Colonoscopy N/A 9/8/2017    Procedure: COLONOSCOPY;  Surgeon: Sheeba Castro MD;  Location: [facility] ENDOSCOPY      Family History   Problem Relation Age of Onset    Other (Other) Father     Diabetes Mother     Hypertension Mother     Cancer Maternal Grandmother         pancreatic 85      Social History:   Social History     Socioeconomic History    Marital status:    Tobacco Use    Smoking status: Never    Smokeless tobacco: Never   Substance and Sexual Activity    Alcohol use: Yes     Comment: Occ 1-2 per year    Drug use: No     Social Determinants of Health     Food Insecurity: No Food Insecurity (1/26/2024)    Food Insecurity     Food Insecurity:  Never true   Transportation Needs: No Transportation Needs (1/26/2024)    Transportation Needs     Lack of Transportation: No   Housing Stability: Low Risk  (1/26/2024)    Housing Stability     Housing Instability: No        Immunizations:   Immunization History   Administered Date(s) Administered    >= 3 Yrs, FLUZONE, Pres Free (74627), Influenza Vaccine, [facility] 09/21/2021    Covid-19 Vaccine Pfizer 30 mcg/0.3 ml 03/16/2021, 04/03/2021, 11/26/2021    FLULAVAL 6 months & older 0.5 ml Prefilled syringe (57250) 10/20/2022    Influenza 10/05/2023    TDAP 04/13/2017       Medications (Active prior to today's visit):  No current outpatient medications on file.       Allergies:  Allergies   Allergen Reactions    Shrimp HIVES and Tightness in Throat     Confirmed allergy with 10/8/18 Adult Allergy Food Profile    Penicillins SWELLING         ROS:   Review of Systems   Constitutional:  Negative for appetite change, fatigue and fever.   HENT:  Negative for ear pain, hearing loss and nosebleeds.    Eyes:  Negative for visual disturbance.   Respiratory:  Negative for apnea, shortness of breath and wheezing.    Cardiovascular:  Negative for chest pain, palpitations and leg swelling.   Gastrointestinal:  Negative for abdominal pain, blood in stool, constipation, nausea and vomiting.   Endocrine: Negative for polydipsia and polyuria.   Genitourinary:  Negative for dyspareunia and hematuria.   Musculoskeletal:  Negative for arthralgias and back pain.   Skin:  Negative for rash.   Allergic/Immunologic: Negative for food allergies.   Neurological:  Negative for dizziness, syncope, light-headedness and headaches.   Psychiatric/Behavioral:  Negative for sleep disturbance.        PHYSICAL EXAM:   VS: /73 (BP Location: Right arm, Patient Position: Sitting, Cuff Size: adult)   Pulse 78   Wt 178 lb 9.6 oz (81 kg)   LMP  (LMP Unknown)   BMI 29.72 kg/m²     Physical Exam  Vitals reviewed.   Constitutional:       Appearance:  She is well-developed.   HENT:      Head: Normocephalic.      Right Ear: Hearing, tympanic membrane, ear canal and external ear normal.      Left Ear: Hearing, tympanic membrane, ear canal and external ear normal.      Nose: Nose normal.      Mouth/Throat:      Mouth: Mucous membranes are moist.   Eyes:      Extraocular Movements: Extraocular movements intact.      Conjunctiva/sclera: Conjunctivae normal.      Pupils: Pupils are equal, round, and reactive to light.   Neck:      Thyroid: No thyromegaly.   Cardiovascular:      Rate and Rhythm: Normal rate and regular rhythm.      Heart sounds: Normal heart sounds, S1 normal and S2 normal. No murmur heard.  Pulmonary:      Effort: Pulmonary effort is normal.      Breath sounds: Normal breath sounds.   Chest:   Breasts:     Right: No mass or tenderness.      Left: No mass or tenderness.   Abdominal:      General: Bowel sounds are normal.      Palpations: Abdomen is soft. There is no mass.      Tenderness: There is no abdominal tenderness.      Hernia: No hernia is present.   Musculoskeletal:      Cervical back: Neck supple.      Comments: Spine without scoliosis or kyphosis.  Range of motions of both upper and lower extremities are normal.   Lymphadenopathy:      Cervical: No cervical adenopathy.      Comments: LEs no edema    Skin:     Findings: No rash.   Neurological:      General: No focal deficit present.      Mental Status: She is alert.      Deep Tendon Reflexes:      Reflex Scores:       Patellar reflexes are 2+ on the right side and 2+ on the left side.  Psychiatric:         Mood and Affect: Mood and affect normal.         LABORATORY RESULTS:     EKG / Spirometry : -     Radiology: No results found.     ASSESSMENT/PLAN:   Assessment   Encounter Diagnoses   Name Primary?    Physical exam Yes    Colon cancer screening        Assessment and Plan:     Sussy [facility] checkup as follows:    LABORATORY & ORDERS:   Orders Placed This Encounter    Procedures    CBC With Differential With Platelet    Comp Metabolic Panel (14)    Hemoglobin A1C    Lipid Panel    TSH W Reflex To Free T4    Vitamin D    Urinalysis with Culture Reflex      REFERRALS: generated today : GASTRO - INTERNAL  EKG 12-LEAD  [facility] 2D+3D SCREENING BILAT (CPT=77067/83050) .    IMMUNIZATIONS ordered and given today include: none.    RECOMMENDATIONS given include: ANTICIPATORY GUIDANCE  topics covered today include: safety (i.e. seat belts, helmets, sunscreen, protective sports gear ), nutrition (i.e. healthy meals and snacks (i.e. avoid junk food and high-carbohydrate foods); athletic conditioning, fluids; low fat milk, limit to less than 20 oz. a day; dental care with her dentist), and healthy habits & social competence & responsibilities: Recommendations on physical activity; exercise daily or at least 3 times a week for 30-60 minutes doing cardiovascular exercise. Patient educated on self breast examination to be done on a monthly basis.  Consider a  if over weight and/or having difficult in staying active. Attempt to keep a schedule that includes adequate sleep, and physical activities/exercise. Patient was educated on sexual transmitted disease. Best to abstain from sexual intercourse until she is ready to form a family. Use of condoms may prevent transmission of infections as well as pregnancy.   Contraception option chosen by patient was none.  REFUSALS:  Although recommended, the patient refuses the following: none .      FOLLOW-UP: Schedule a follow-up visit in 12 months.            Orders This Visit:  Orders Placed This Encounter   Procedures    CBC With Differential With Platelet    Comp Metabolic Panel (14)    Hemoglobin A1C    Lipid Panel    TSH W Reflex To Free T4    Vitamin D    Urinalysis with Culture Reflex       Meds This Visit:  Requested Prescriptions      No prescriptions requested or ordered in this encounter       Imaging & Referrals:  GASTRO -  INTERNAL  EKG 12-LEAD  [facility] 2D+3D SCREENING BILAT (CPT=77067/14316)         JASWANT ORTIZ MD

## 2024-05-18 ENCOUNTER — EKG ENCOUNTER (OUTPATIENT)
Dept: LAB | Age: 49
End: 2024-05-18
Attending: FAMILY MEDICINE

## 2024-05-18 ENCOUNTER — LAB ENCOUNTER (OUTPATIENT)
Dept: LAB | Age: 49
End: 2024-05-18
Attending: FAMILY MEDICINE

## 2024-05-18 DIAGNOSIS — Z00.00 PHYSICAL EXAM: ICD-10-CM

## 2024-05-18 LAB
ALBUMIN SERPL-MCNC: 4.2 G/DL (ref 3.2–4.8)
ALBUMIN/GLOB SERPL: 1.4 {RATIO} (ref 1–2)
ALP LIVER SERPL-CCNC: 87 U/L
ALT SERPL-CCNC: 16 U/L
ANION GAP SERPL CALC-SCNC: 4 MMOL/L (ref 0–18)
AST SERPL-CCNC: 18 U/L (ref ?–34)
ATRIAL RATE: 63 BPM
BASOPHILS # BLD AUTO: 0.07 X10(3) UL (ref 0–0.2)
BASOPHILS NFR BLD AUTO: 1 %
BILIRUB SERPL-MCNC: 0.6 MG/DL (ref 0.3–1.2)
BILIRUB UR QL: NEGATIVE
BUN BLD-MCNC: 9 MG/DL (ref 9–23)
BUN/CREAT SERPL: 12.5 (ref 10–20)
CALCIUM BLD-MCNC: 8.9 MG/DL (ref 8.7–10.4)
CHLORIDE SERPL-SCNC: 113 MMOL/L (ref 98–112)
CHOLEST SERPL-MCNC: 111 MG/DL (ref ?–200)
CLARITY UR: CLEAR
CO2 SERPL-SCNC: 24 MMOL/L (ref 21–32)
CREAT BLD-MCNC: 0.72 MG/DL
DEPRECATED RDW RBC AUTO: 43.8 FL (ref 35.1–46.3)
EGFRCR SERPLBLD CKD-EPI 2021: 102 ML/MIN/1.73M2 (ref 60–?)
EOSINOPHIL # BLD AUTO: 0.86 X10(3) UL (ref 0–0.7)
EOSINOPHIL NFR BLD AUTO: 12.4 %
ERYTHROCYTE [DISTWIDTH] IN BLOOD BY AUTOMATED COUNT: 14.5 % (ref 11–15)
EST. AVERAGE GLUCOSE BLD GHB EST-MCNC: 126 MG/DL (ref 68–126)
FASTING PATIENT LIPID ANSWER: YES
FASTING STATUS PATIENT QL REPORTED: YES
GLOBULIN PLAS-MCNC: 3 G/DL (ref 2–3.5)
GLUCOSE BLD-MCNC: 93 MG/DL (ref 70–99)
GLUCOSE UR-MCNC: NORMAL MG/DL
HBA1C MFR BLD: 6 % (ref ?–5.7)
HCT VFR BLD AUTO: 38.4 %
HDLC SERPL-MCNC: 44 MG/DL (ref 40–59)
HGB BLD-MCNC: 12.2 G/DL
HGB UR QL STRIP.AUTO: NEGATIVE
IMM GRANULOCYTES # BLD AUTO: 0.01 X10(3) UL (ref 0–1)
IMM GRANULOCYTES NFR BLD: 0.1 %
KETONES UR-MCNC: NEGATIVE MG/DL
LDLC SERPL CALC-MCNC: 54 MG/DL (ref ?–100)
LEUKOCYTE ESTERASE UR QL STRIP.AUTO: 25
LYMPHOCYTES # BLD AUTO: 1.48 X10(3) UL (ref 1–4)
LYMPHOCYTES NFR BLD AUTO: 21.3 %
MCH RBC QN AUTO: 26.5 PG (ref 26–34)
MCHC RBC AUTO-ENTMCNC: 31.8 G/DL (ref 31–37)
MCV RBC AUTO: 83.5 FL
MONOCYTES # BLD AUTO: 0.47 X10(3) UL (ref 0.1–1)
MONOCYTES NFR BLD AUTO: 6.8 %
NEUTROPHILS # BLD AUTO: 4.07 X10 (3) UL (ref 1.5–7.7)
NEUTROPHILS # BLD AUTO: 4.07 X10(3) UL (ref 1.5–7.7)
NEUTROPHILS NFR BLD AUTO: 58.4 %
NITRITE UR QL STRIP.AUTO: NEGATIVE
NONHDLC SERPL-MCNC: 67 MG/DL (ref ?–130)
OSMOLALITY SERPL CALC.SUM OF ELEC: 290 MOSM/KG (ref 275–295)
P AXIS: 22 DEGREES
P-R INTERVAL: 114 MS
PH UR: 7 [PH] (ref 5–8)
PLATELET # BLD AUTO: 278 10(3)UL (ref 150–450)
POTASSIUM SERPL-SCNC: 3.9 MMOL/L (ref 3.5–5.1)
PROT SERPL-MCNC: 7.2 G/DL (ref 5.7–8.2)
PROT UR-MCNC: NEGATIVE MG/DL
Q-T INTERVAL: 400 MS
QRS DURATION: 78 MS
QTC CALCULATION (BEZET): 409 MS
R AXIS: 15 DEGREES
RBC # BLD AUTO: 4.6 X10(6)UL
SODIUM SERPL-SCNC: 141 MMOL/L (ref 136–145)
SP GR UR STRIP: 1.02 (ref 1–1.03)
T AXIS: 22 DEGREES
TRIGL SERPL-MCNC: 56 MG/DL (ref 30–149)
TSI SER-ACNC: 1.51 MIU/ML (ref 0.55–4.78)
UROBILINOGEN UR STRIP-ACNC: NORMAL
VENTRICULAR RATE: 63 BPM
VIT D+METAB SERPL-MCNC: 33.1 NG/ML (ref 30–100)
VLDLC SERPL CALC-MCNC: 8 MG/DL (ref 0–30)
WBC # BLD AUTO: 7 X10(3) UL (ref 4–11)

## 2024-05-18 PROCEDURE — 93005 ELECTROCARDIOGRAM TRACING: CPT

## 2024-05-18 PROCEDURE — 87086 URINE CULTURE/COLONY COUNT: CPT

## 2024-05-18 PROCEDURE — 36415 COLL VENOUS BLD VENIPUNCTURE: CPT

## 2024-05-18 PROCEDURE — 81001 URINALYSIS AUTO W/SCOPE: CPT

## 2024-05-18 PROCEDURE — 83036 HEMOGLOBIN GLYCOSYLATED A1C: CPT

## 2024-05-18 PROCEDURE — 85025 COMPLETE CBC W/AUTO DIFF WBC: CPT

## 2024-05-18 PROCEDURE — 93010 ELECTROCARDIOGRAM REPORT: CPT | Performed by: INTERNAL MEDICINE

## 2024-05-18 PROCEDURE — 84443 ASSAY THYROID STIM HORMONE: CPT

## 2024-05-18 PROCEDURE — 82306 VITAMIN D 25 HYDROXY: CPT

## 2024-05-18 PROCEDURE — 80061 LIPID PANEL: CPT

## 2024-05-18 PROCEDURE — 80053 COMPREHEN METABOLIC PANEL: CPT

## 2024-06-01 ENCOUNTER — NURSE ONLY (OUTPATIENT)
Dept: FAMILY MEDICINE CLINIC | Facility: CLINIC | Age: 49
End: 2024-06-01
Payer: COMMERCIAL

## 2024-06-01 DIAGNOSIS — Z30.42 ENCOUNTER FOR DEPO-PROVERA CONTRACEPTION: Primary | ICD-10-CM

## 2024-06-01 RX ADMIN — MEDROXYPROGESTERONE ACETATE 150 MG: 150 INJECTION, SUSPENSION INTRAMUSCULAR at 09:30:00

## 2024-06-01 NOTE — PROGRESS NOTES
Patient is here for Depo-Provera contraception injection. Verified full name and . Patient tolerated injection well, no reactions noted at this time.    Informed patient of next window: Aug. 17 to Aug. 31, to receive injection. Patient verbalized understanding.

## 2024-07-27 ENCOUNTER — HOSPITAL ENCOUNTER (OUTPATIENT)
Dept: MAMMOGRAPHY | Facility: HOSPITAL | Age: 49
Discharge: HOME | End: 2024-07-27
Attending: FAMILY MEDICINE
Payer: COMMERCIAL

## 2024-07-27 DIAGNOSIS — Z00.00 PHYSICAL EXAM: ICD-10-CM

## 2024-07-27 PROCEDURE — 77067 SCR MAMMO BI INCL CAD: CPT | Performed by: FAMILY MEDICINE

## 2024-07-27 PROCEDURE — 77063 BREAST TOMOSYNTHESIS BI: CPT | Performed by: FAMILY MEDICINE

## 2024-08-24 ENCOUNTER — NURSE ONLY (OUTPATIENT)
Dept: FAMILY MEDICINE CLINIC | Facility: CLINIC | Age: 49
End: 2024-08-24

## 2024-08-24 DIAGNOSIS — Z30.42 ENCOUNTER FOR DEPO-PROVERA CONTRACEPTION: Primary | ICD-10-CM

## 2024-08-24 PROCEDURE — 96372 THER/PROPH/DIAG INJ SC/IM: CPT | Performed by: NURSE PRACTITIONER

## 2024-08-24 RX ADMIN — MEDROXYPROGESTERONE ACETATE 150 MG: 150 INJECTION, SUSPENSION INTRAMUSCULAR at 09:33:00

## 2024-08-24 NOTE — PROGRESS NOTES
Patient is here for her depo- provera injection. I verified full name, and reason for nurse visit. Patient tolerated injection well.     Next window dates given:  - .

## 2024-11-16 ENCOUNTER — NURSE ONLY (OUTPATIENT)
Dept: FAMILY MEDICINE CLINIC | Facility: CLINIC | Age: 49
End: 2024-11-16
Payer: COMMERCIAL

## 2024-11-16 DIAGNOSIS — Z30.42 ENCOUNTER FOR DEPO-PROVERA CONTRACEPTION: Primary | ICD-10-CM

## 2024-11-16 PROCEDURE — 90471 IMMUNIZATION ADMIN: CPT | Performed by: FAMILY MEDICINE

## 2024-11-16 PROCEDURE — 90656 IIV3 VACC NO PRSV 0.5 ML IM: CPT | Performed by: FAMILY MEDICINE

## 2024-11-16 PROCEDURE — 96372 THER/PROPH/DIAG INJ SC/IM: CPT | Performed by: NURSE PRACTITIONER

## 2024-11-16 RX ADMIN — MEDROXYPROGESTERONE ACETATE 150 MG: 150 INJECTION, SUSPENSION INTRAMUSCULAR at 10:16:00

## 2024-11-16 NOTE — H&P
Depo-Provera 150 mg/ml IM given to the patients today  LOT  # [id number]  EXP 04/26     [employer]    Next due Feb 1st  - Feb 15 th

## 2025-01-30 ENCOUNTER — TELEPHONE (OUTPATIENT)
Dept: FAMILY MEDICINE CLINIC | Facility: CLINIC | Age: 50
End: 2025-01-30

## 2025-01-30 DIAGNOSIS — Z30.42 ENCOUNTER FOR SURVEILLANCE OF INJECTABLE CONTRACEPTIVE: Primary | ICD-10-CM

## 2025-01-30 RX ORDER — MEDROXYPROGESTERONE ACETATE 150 MG/ML
150 INJECTION, SUSPENSION INTRAMUSCULAR
Status: SHIPPED | OUTPATIENT
Start: 2025-02-02

## 2025-01-30 NOTE — TELEPHONE ENCOUNTER
Depo-provera, injection order was pended and she is due first 2 weeks of Feb. Last Depo-provera was given in November.

## 2025-01-30 NOTE — TELEPHONE ENCOUNTER
Patient called to request an Order for a Depo shot. Also, please call patient when ready so she can schedule.

## 2025-02-10 ENCOUNTER — HOSPITAL ENCOUNTER (OUTPATIENT)
Age: 50
Discharge: HOME | End: 2025-02-10
Payer: COMMERCIAL

## 2025-02-10 ENCOUNTER — NURSE ONLY (OUTPATIENT)
Dept: FAMILY MEDICINE CLINIC | Facility: CLINIC | Age: 50
End: 2025-02-10
Payer: COMMERCIAL

## 2025-02-10 VITALS
OXYGEN SATURATION: 99 % | RESPIRATION RATE: 18 BRPM | DIASTOLIC BLOOD PRESSURE: 45 MMHG | HEART RATE: 90 BPM | SYSTOLIC BLOOD PRESSURE: 107 MMHG | TEMPERATURE: 99 F

## 2025-02-10 DIAGNOSIS — Z30.42 ENCOUNTER FOR SURVEILLANCE OF INJECTABLE CONTRACEPTIVE: Primary | ICD-10-CM

## 2025-02-10 DIAGNOSIS — B34.9 VIRAL SYNDROME: Primary | ICD-10-CM

## 2025-02-10 LAB
POCT INFLUENZA A: NEGATIVE
POCT INFLUENZA B: NEGATIVE
S PYO AG THROAT QL: NEGATIVE
SARS-COV-2 RNA RESP QL NAA+PROBE: NOT DETECTED

## 2025-02-10 PROCEDURE — 99213 OFFICE O/P EST LOW 20 MIN: CPT | Performed by: NURSE PRACTITIONER

## 2025-02-10 PROCEDURE — 87502 INFLUENZA DNA AMP PROBE: CPT | Performed by: NURSE PRACTITIONER

## 2025-02-10 PROCEDURE — U0002 COVID-19 LAB TEST NON-CDC: HCPCS | Performed by: NURSE PRACTITIONER

## 2025-02-10 PROCEDURE — 87880 STREP A ASSAY W/OPTIC: CPT | Performed by: NURSE PRACTITIONER

## 2025-02-10 RX ADMIN — MEDROXYPROGESTERONE ACETATE 150 MG: 150 INJECTION, SUSPENSION INTRAMUSCULAR at 17:04:00

## 2025-02-10 NOTE — PROGRESS NOTES
Patient is here for her depo- provera injection. I verified full name, and reason for nurse visit. Patient tolerated injection well.     Patient was advised to come back for the next depo injection within the following dates.  - May 12, she verbalized understanding and had no questions at this time.

## 2025-02-10 NOTE — ED INITIAL ASSESSMENT (HPI)
Pt reports body aches/chills x 2 weeks  but states pst few days has worsened with sore throat, headache. C/o tactile temperature.

## 2025-02-10 NOTE — ED PROVIDER NOTES
He    Patient Seen in: [facility]      History     Chief Complaint   Patient presents with    Body ache and/or chills     Stated Complaint: Sore Throat; Body ache; Bad Headache  Subjective:   49-year-old healthy female presents for body aches, headache, chills, congestion, and sore throat that has been intermittent for the past couple weeks.  No chest pain or difficulty breathing.  No difficulty swallowing.  No coughing.  She is eating and drinking without vomiting or diarrhea.  No abdominal pain.  No urinary symptoms.  Her headache is generalized.  No dizziness.  No neck stiffness.  No rashes.  No sick contacts at home.  She has been taking ibuprofen with relief.  She appears nontoxic.      Objective:   Past Medical History:    Miscarriage (HCC)            Past Surgical History:   Procedure Laterality Date    Anesth,vaginal delivery      Appendectomy      Colonoscopy N/A 9/8/2017    Procedure: COLONOSCOPY;  Surgeon: Sheeba Castro MD;  Location: [facility]              Social History     Socioeconomic History    Marital status:    Tobacco Use    Smoking status: Never    Smokeless tobacco: Never   Substance and Sexual Activity    Alcohol use: Yes     Comment: Occ 1-2 per year    Drug use: No     Social Drivers of Health     Food Insecurity: No Food Insecurity (1/26/2024)    Food Insecurity     Food Insecurity: Never true   Transportation Needs: No Transportation Needs (1/26/2024)    Transportation Needs     Lack of Transportation: No   Housing Stability: Low Risk  (1/26/2024)    Housing Stability     Housing Instability: No            Review of Systems    Positive for stated complaint: Body ache and/or chills     Other systems are as noted in HPI.  Constitutional and vital signs reviewed.      All other systems reviewed and negative except as noted above.    Physical Exam     ED Triage Vitals [02/10/25 1638]   /45   Pulse 90   Resp 18   Temp 99.1 °F (37.3 °C)   Temp src Oral    SpO2 99 %   O2 Device None (Room air)     Current:/45   Pulse 90   Temp 99.1 °F (37.3 °C) (Oral)   Resp 18   LMP  (LMP Unknown)   SpO2 99%     Physical Exam  Vitals and nursing note reviewed.   Constitutional:       General: She is not in acute distress.     Appearance: Normal appearance. She is not toxic-appearing.   HENT:      Head: Normocephalic.      Right Ear: Tympanic membrane normal.      Left Ear: Tympanic membrane normal.      Nose: Congestion present. No rhinorrhea.      Mouth/Throat:      Mouth: Mucous membranes are moist.      Pharynx: Oropharynx is clear. Posterior oropharyngeal erythema present. No oropharyngeal exudate.   Eyes:      Extraocular Movements: Extraocular movements intact.      Conjunctiva/sclera: Conjunctivae normal.      Pupils: Pupils are equal, round, and reactive to light.   Cardiovascular:      Rate and Rhythm: Normal rate and regular rhythm.   Pulmonary:      Effort: Pulmonary effort is normal.      Breath sounds: Normal breath sounds. No wheezing, rhonchi or rales.   Musculoskeletal:         General: Normal range of motion.      Cervical back: Normal range of motion and neck supple.   Lymphadenopathy:      Cervical: No cervical adenopathy.   Skin:     General: Skin is warm and dry.      Capillary Refill: Capillary refill takes less than 2 seconds.      Findings: No rash.   Neurological:      General: No focal deficit present.      Mental Status: She is alert and oriented to person, place, and time.   Psychiatric:         Mood and Affect: Mood normal.         Behavior: Behavior normal.         ED Course   No results found.  Labs Reviewed   POCT RAPID STREP - Normal   POCT FLU TEST - Normal    Narrative:     This assay is a rapid molecular in vitro test utilizing nucleic acid amplification of influenza A and B viral RNA.   RAPID SARS-COV-2 BY PCR - Normal       MDM     Medical Decision Making  The patient is aware of all of the results below.  Her symptoms are most  likely viral.  She does appear well.  We discussed supportive care including Tylenol or ibuprofen as needed for pain or fever, salt water gargles for the sore throat, pushing fluids, and rest.  The patient is aware if her symptoms persist or worsen, she should follow-up closely with her primary care provider.    Amount and/or Complexity of Data Reviewed  Labs: ordered.     Details: Flu negative  COVID-negative  Strep negative        Disposition and Plan     Clinical Impression:  1. Viral syndrome         Disposition:  Discharge  2/10/2025  5:13 pm    Follow-up:  Bull Walker MD  [address] [place] [address]  [phone]    Schedule an appointment as soon as possible for a visit   As needed          Medications Prescribed:  There are no discharge medications for this patient.

## 2025-02-10 NOTE — DISCHARGE INSTRUCTIONS
Continue to push fluids, rest, and Tylenol or Motrin as needed for pain or fever.  Follow-up with your primary care provider if your symptoms persist.  Return for any concerns.

## 2025-02-18 ENCOUNTER — HOSPITAL ENCOUNTER (OUTPATIENT)
Age: 50
Discharge: HOME | End: 2025-02-18
Payer: COMMERCIAL

## 2025-02-18 VITALS
RESPIRATION RATE: 18 BRPM | HEART RATE: 86 BPM | OXYGEN SATURATION: 100 % | TEMPERATURE: 99 F | DIASTOLIC BLOOD PRESSURE: 79 MMHG | SYSTOLIC BLOOD PRESSURE: 137 MMHG

## 2025-02-18 DIAGNOSIS — R21 RASH: Primary | ICD-10-CM

## 2025-02-18 PROCEDURE — 99213 OFFICE O/P EST LOW 20 MIN: CPT | Performed by: PHYSICIAN ASSISTANT

## 2025-02-18 RX ORDER — LORATADINE 10 MG/1
10 TABLET ORAL DAILY
Qty: 10 TABLET | Refills: 0 | Status: SHIPPED | OUTPATIENT
Start: 2025-02-18 | End: 2025-02-28

## 2025-02-18 RX ORDER — DIPHENHYDRAMINE HCL 25 MG
CAPSULE ORAL EVERY 6 HOURS PRN
Qty: 40 CAPSULE | Refills: 0 | Status: SHIPPED | OUTPATIENT
Start: 2025-02-18 | End: 2025-02-23

## 2025-02-18 RX ORDER — PREDNISONE 20 MG/1
40 TABLET ORAL DAILY
Qty: 10 TABLET | Refills: 0 | Status: SHIPPED | OUTPATIENT
Start: 2025-02-18 | End: 2025-02-23

## 2025-02-18 NOTE — ED PROVIDER NOTES
Patient Seen in: [facility]    History     Chief Complaint   Patient presents with    Rash     Stated Complaint: rash    HPI    Sussy Sal Mckay is a 49 year old female who presents with chief complaint of rash.   Onset 2 days ago.  Rash is located at bilateral torso, bilateral upper extremities and bilateral face.  Patient complains of associated pruritis.  Patient denies exposure to new medication, food, soap or plant.  Patient denies sick contacts.  Patient denies fever, chills, abdominal pain, nausea, vomiting, diarrhea, constipation, sore throat, cough, dyspnea, wheeze, mouth/throat swelling.      Past Medical History:    Miscarriage (HCC)       Past Surgical History:   Procedure Laterality Date    Anesth,vaginal delivery      Appendectomy      Colonoscopy N/A 9/8/2017    Procedure: COLONOSCOPY;  Surgeon: Sheeba Castro MD;  Location: [facility] ENDOSCOPY            Family History   Problem Relation Age of Onset    Diabetes Mother     Hypertension Mother     Other (Other) Father     Cancer Maternal Grandmother         pancreatic 85    Breast Cancer Neg        Social History     Socioeconomic History    Marital status:    Tobacco Use    Smoking status: Never    Smokeless tobacco: Never   Substance and Sexual Activity    Alcohol use: Yes     Comment: Occ 1-2 per year    Drug use: No     Social Drivers of Health     Food Insecurity: No Food Insecurity (1/26/2024)    Food Insecurity     Food Insecurity: Never true   Transportation Needs: No Transportation Needs (1/26/2024)    Transportation Needs     Lack of Transportation: No   Housing Stability: Low Risk  (1/26/2024)    Housing Stability     Housing Instability: No       Review of Systems    Positive for stated complaint: rash  Other systems are as noted in HPI.  Constitutional and vital signs reviewed.      All other systems reviewed and negative except as noted above.    PSFH elements reviewed from today and agreed  except as otherwise stated in HPI.    Physical Exam     ED Triage Vitals [02/18/25 1730]   /79   Pulse 86   Resp 18   Temp 98.6 °F (37 °C)   Temp src Oral   SpO2 100 %   O2 Device None (Room air)       Current:/79   Pulse 86   Temp 98.6 °F (37 °C) (Oral)   Resp 18   LMP  (LMP Unknown)   SpO2 100%     PULSE OX within normal limits on room air as interpreted by this provider.    Constitutional: The patient is cooperative. Appears well-developed and well-nourished.  No acute distress.  Psychological: Alert, No abnormalities of mood, affect.  Head: Normocephalic/atraumatic.  Eyes: Pupils are equal round reactive to light.  Conjunctiva are within normal limits.  ENT: Oropharynx is clear.  No angioedema.  Neck: The neck is supple.  Nontender.  No meningeal signs.  Chest: There is no tenderness to the chest wall.  Respiratory: Respiratory effort was normal.  There is no rales, wheezes, or rhonchi.  There is no stridor.  Air entry is equal.  Cardiovascular: Regular rate and rhythm.  Capillary refill is brisk.   Genitourinary: Not examined.  Lymphatic: No gross lymphadenopathy noted.  Musculoskeletal: Musculoskeletal system is grossly intact.  There is no obvious deformity.  Neurological: Gross motor movement is intact in all 4 extremities.  Patient exhibits normal speech.  Skin: Diffuse distribution of erythematous, pruritic urticaria present at bilateral torso, bilateral upper extremities and bilateral face.  No open wound, erythematous tracking, induration or fluctuance.  No obvious bruising.            ED Course   Labs Reviewed - No data to display    MDM     Differential diagnosis including but not limited to allergic reaction, urticaria, contact dermatitis, nonspecific rash    Physical exam remained stable as previously documented.  Physical exam findings discussed with patient.    I have given the patient instructions regarding their diagnoses, expectations, follow up, and ER precautions. I explained  to the patient that emergent conditions may arise and to go to the ER for new, worsening or any persistent conditions. I've explained the importance of following up with their doctor as instructed. The patient verbalized understanding of the discharge instructions and plan.            Disposition and Plan     Clinical Impression:  1. Rash        Disposition:  Discharge    Follow-up:  Bull Walker MD  [address] [address]  [phone]    Call in 1 day  For follow-up    Silvio Barcenas MD  [address] [facility] [id number] [phone]    Call in 1 week  For follow-up      Medications Prescribed:  Current Discharge Medication List        START taking these medications    Details   loratadine 10 MG Oral Tab Take 1 tablet (10 mg total) by mouth daily for 10 days.  Qty: 10 tablet, Refills: 0      predniSONE 20 MG Oral Tab Take 2 tablets (40 mg total) by mouth daily for 5 days.  Qty: 10 tablet, Refills: 0      diphenhydrAMINE (BENADRYL ALLERGY) 25 MG Oral Cap Take 1-2 capsules (25-50 mg total) by mouth every 6 (six) hours as needed for Itching (Rash).  Qty: 40 capsule, Refills: 0

## 2025-04-07 ENCOUNTER — OFFICE VISIT (OUTPATIENT)
Dept: OBGYN CLINIC | Facility: CLINIC | Age: 50
End: 2025-04-07

## 2025-04-07 VITALS
SYSTOLIC BLOOD PRESSURE: 133 MMHG | HEIGHT: 65 IN | BODY MASS INDEX: 28.32 KG/M2 | HEART RATE: 71 BPM | DIASTOLIC BLOOD PRESSURE: 84 MMHG | WEIGHT: 170 LBS

## 2025-04-07 DIAGNOSIS — N95.8 GENITOURINARY SYNDROME OF MENOPAUSE: ICD-10-CM

## 2025-04-07 DIAGNOSIS — N95.1 PERIMENOPAUSAL VASOMOTOR SYMPTOMS: ICD-10-CM

## 2025-04-07 DIAGNOSIS — Z12.31 ENCOUNTER FOR SCREENING MAMMOGRAM FOR BREAST CANCER: ICD-10-CM

## 2025-04-07 DIAGNOSIS — Z01.419 ENCOUNTER FOR WELL WOMAN EXAM WITH ROUTINE GYNECOLOGICAL EXAM: Primary | ICD-10-CM

## 2025-04-07 RX ORDER — CITRULL/ARGIN/PINE XT/ROSE HIP 400-400 MG
2 TABLET ORAL AS DIRECTED
Qty: 10 SUPPOSITORY | Refills: 5 | Status: SHIPPED | OUTPATIENT
Start: 2025-04-07

## 2025-04-07 NOTE — PROGRESS NOTES
Sussy Mckay is a 49 year old female  No LMP recorded (lmp unknown). Patient is postmenopausal.   Chief Complaint   Patient presents with    Annual     NP, Annual exam with pap   Reviewed Preventative/Wellness form with patient.       And c/o decreased libido, vaginal dryness  And hot flashes, fatigue, brain fog    BCM: depo provera, last injection 2025  Amenorrheic     Last pap , nil  Due for colonoscopy    OBSTETRICS HISTORY:     OB History    Para Term  AB Living   4 2     2 2   SAB IAB Ectopic Multiple Live Births   2              # Outcome Date GA Lbr James/2nd Weight Sex Type Anes PTL Lv   4 SAB            3 SAB            2 Para      NORMAL SPONT      1 Para      NORMAL SPONT          GYNE HISTORY:     Hx Prior Abnormal Pap: No  Pap Date: 22  Pap Result Notes: negative   Menarche: 13 (2025  4:09 PM)  Period Cycle (Days): ? on depo provewra (2025  4:09 PM)  Use of Birth Control (if yes, specify type): Depo Provera (2025  4:09 PM)  Hx Prior Abnormal Pap: No (2025  4:09 PM)  Pap Date: 22 (2025  4:09 PM)  Pap Result Notes: negative (2025  4:09 PM)        Latest Ref Rng & Units 2022     2:09 PM 2020     2:18 PM 2018    11:00 AM   RECENT PAP RESULTS   INTERPRETATION/RESULT: Negative for intraepithelial lesion or malignancy Negative for intraepithelial lesion or malignancy  Negative for intraepithelial lesion or malignancy  Negative for intraepithelial lesion or malignancy    HPV Negative Negative  Negative  Negative          MEDICAL HISTORY:     Past Medical History:    Miscarriage (HCC)       SURGICAL HISTORY:     Past Surgical History:   Procedure Laterality Date    Anesth,vaginal delivery      Appendectomy      Cholecystectomy      Colonoscopy N/A 2017    Procedure: COLONOSCOPY;  Surgeon: Sheeba Castro MD;  Location: [facility] ENDOSCOPY       SOCIAL HISTORY:     Social History     Socioeconomic  History    Marital status:    Tobacco Use    Smoking status: Never    Smokeless tobacco: Never   Vaping Use    Vaping status: Never Used   Substance and Sexual Activity    Alcohol use: Yes     Comment: Occ 1-2 per year    Drug use: No    Sexual activity: Yes     Partners: Male     Social Drivers of Health     Food Insecurity: No Food Insecurity (4/7/2025)    NCSS - Food Insecurity     Worried About Running Out of Food in the Last Year: No     Ran Out of Food in the Last Year: No   Transportation Needs: No Transportation Needs (4/7/2025)    NCSS - Transportation     Lack of Transportation: No   Housing Stability: Not At Risk (4/7/2025)    NCSS - Housing/Utilities     Has Housing: Yes     Worried About Losing Housing: No     Unable to Get Utilities: No        FAMILY HISTORY:     Family History   Problem Relation Age of Onset    Diabetes Mother     Hypertension Mother     Other (Other) Father     Cancer Maternal Grandmother         pancreatic 85    Breast Cancer Neg        MEDICATIONS:       Current Outpatient Medications:     Vaginal Lubricant (REVAREE) 0.25 % Vaginal Suppos, Place 2 g vaginally As Directed. Insert Revaree at bedtime. Lie down after insertion. Use every 2-3 days for continued relief and best results, Disp: 10 suppository, Rfl: 5    ALLERGIES:     Allergies[1]      REVIEW OF SYSTEMS:     Constitutional:    denies fever / chills  Eyes:     denies blurred or double vision  Cardiovascular:  denies chest pain or palpitations  Respiratory:    denies shortness of breath  Gastrointestinal:  denies severe abdominal pain, frequent diarrhea or constipation, nausea / vomiting  Genitourinary:    denies dysuria, bothersome incontinence  Skin/Breast:   denies any breast pain, lumps, or discharge  Neurological:    denies frequent severe headaches  Psychiatric:   denies depression or anxiety, thoughts of harming self or others  Heme/Lymph:    denies easy bruising or bleeding      PHYSICAL EXAM:   Blood pressure  133/84, pulse 71, height 5' 5\" (1.651 m), weight 170 lb (77.1 kg), not currently breastfeeding.  Constitutional:  well developed, well nourished  Head/Face:  normocephalic  Neck/Thyroid: thyroid symmetric, no thyromegaly, no nodules, no adenopathy  Lymphatic: no abnormal supraclavicular or axillary adenopathy is noted  Respiratory:      chest wall symmetric and nontender on palpation, clear to asculation bilateral, no wheezing, rales, ronchi, and resonance normal upon percussion  Cardiovascular: chest normal in appearance, regular rate and rhythm, no murmurs, PMI palpated midclavicular line  Breast:   normal without palpable masses, tenderness, asymmetry, nipple discharge, nipple retraction or skin changes  Abdomen:   soft, nontender, nondistended, no masses  Skin/Hair:  no unusual rashes or bruises  Extremities:  no edema, no cyanosis, non tender bilaterally  Psychiatric:   oriented to time, place, person and situation. Appropriate mood and affect    Pelvic Exam:  GYNE/: External Genitalia: Normal appearing, no lesions. Urethral meatus appear wnl, no abnormal discharge or lesions noted.          Bladder: well supported, urethra wnl, no lesions or fissures                     Vagina: normal pink mucosa, no lesions, normal clear discharge.                      Uterus: anteverted, mobile, non tender, normal size                     Cervix: Normal,                     Adnexa: non tender, no masses, normal size  Anus: no hemorroids         ASSESSMENT & PLAN:     Sussy was seen today for annual.    Diagnoses and all orders for this visit:    Encounter for well woman exam with routine gynecological exam  -     ThinPrep PAP Smear; Future  -     Hpv Dna  High Risk , Thin Prep Collect; Future  -     ThinPrep PAP Smear  -     Hpv Dna  High Risk , Thin Prep Collect    Encounter for screening mammogram for breast cancer  -     [facility] ARISTIDES 2D+3D SCREENING BILAT (CPT=77067/07592); Future    Genitourinary syndrome of  menopause  -     Vaginal Lubricant (REVAREE) 0.25 % Vaginal Suppos; Place 2 g vaginally As Directed. Insert Revaree at bedtime. Lie down after insertion. Use every 2-3 days for continued relief and best results    Perimenopausal vasomotor symptoms      Normal exam.  Pap smear done.   Recommend repeat pap smear with co-testing every 3 years for normal/ -HPV.  Recommend annual screening mammograms.   Recommend screening colonoscopy starting at 45  Recommend DEXA scan for osteoporosis as indicated.  Discussed importance of incorporating frequent weight bearing exercises and supplemental Vitamin D  Maintain healthy lifestyle with well-balance diet and daily exercise.  Return to clinic in one year or as needed.    Reviewed tx options for VSM, hormonal vs non hormonal  May consider and let us know    FOLLOW-UP     No follow-ups on file.      Kathie Tariq MD  4/7/2025         [1]   Allergies  Allergen Reactions    Shrimp HIVES and Tightness in Throat     Confirmed allergy with 10/8/18 Adult Allergy Food Profile    Penicillins SWELLING

## 2025-04-07 NOTE — PATIENT INSTRUCTIONS
[facility], Dr. Cielo العلي. You may call [phone] to schedule.  Dr. Cielo العلي part of [facility] specializes in sexual health and has started a [facility] working close with patients as challenges arise throughout their lives. She can do a lot of her work through zoom, so that the commute is not as burdensome.     Here is her profile:  [email or web address]        De nuevo, alexander es el libro que recomiendo: Heather al Orgasmo: La Sexualidad Femenina sin Secretos     Aqui hay otros recursos:  https://espanol.womenshealth.gov/menopause/menopause-and-sexuality  https://www.elpradopsicologos.es/blog/vvvm-cwltud-ws-orgasmo/  [email or web address]://[email or web address]/diseases-conditions/anorgasmia/diagnosis-treatment/[email or web address]      El tratamiento para la anorgasmia depende de los factores que contribuyen al problema. Los posibles tratamientos incluyen cambios en el estilo de dannielle, terapia y medicamentos. Si heriberto enfermedad subyacente contribuye a la anorgasmia, tu profesional de atención médica te recomendará un tratamiento adecuado.    Cambios en el estilo de dannielle y la terapia  Por lo general, el tratamiento de la anorgasmia comienza con ion o más enfoques para comprender mejor el cuerpo, conocer qué funciona en tu charmaine y cambiar los comportamientos. Estos tratamientos pueden incluir lo siguiente:    Educación. Es probable que, para comenzar, el profesional de atención médica hable sobre la anatomía sexual femenina y la forma en que las diferentes partes de esta responden a la estimulación. Esta conversación puede ayudarte a abordar las preguntas que tengas, a aclarar malentendidos y a comprender el propósito de otros tratamientos recomendados. Es posible que tu profesional de atención médica también te recomiende materiales educativos.    Masturbación dirigida. Alexander programa de  instrucción y ejercicios para hacer en casa ayuda a que te familiarices con tu propio cuerpo y explores la estimulación sexual autodirigida. Después de que hayas aprendido a llegar al orgasmo, puedes practicar con tu jose lo que aprendiste.  Enfoque en los sentidos. Aaliyah enfoque para parejas ofrece instrucción y ejercicios para hacer en casa. Comienza por el contacto no erótico y agrega progresivamente el contacto más íntimo y la estimulación sexual. El objetivo es que cada miembro de la jose comprenda las necesidades de la otra persona y que aprendan a comunicarse y guiarse mutuamente para llegar al orgasmo.    Cambios en las posiciones sexuales. Es posible que el profesional de atención médica recomiende realizar otras posiciones sexuales que aumenten la estimulación del clítoris esther las relaciones sexuales vaginales.    Dispositivos para mejorar la actividad sexual. Los dispositivos que mejoran la estimulación sexual pueden ayudarte a tener un orgasmo. Entre ellos, están vibradores y dispositivos de pulso de aire que estimulan el clítoris. Al colocar otro dispositivo sobre el clítoris, se crea heriberto succión suave para aumentar el flujo sanguíneo. Es posible que el profesional de atención médica te recomiende usarlos carlos para saber qué funciona en tu charmaine y, luego, probarlos con tu jose.    Terapia cognitivo conductual. La terapia individual o de jose puede ayudarte a hablar sobre tu opinión en cuanto a las relaciones sexuales en general o con tu jose. La terapia puede ayudar con comportamientos que promueven buenas relaciones sexuales, abiodun aprender maneras de hablar sobre sexo con tu jose o comunicar tus necesidades esther las relaciones sexuales.    6 consejos para llegar al orgasmo más fácilmente  La presión externa que nos dice que en todo encuentro sexual debe existir al menos un orgasmo nos hace pensar que heriberto relación sexual no es plena si no hay orgasmo, lo vemos abiodun algo obligatorio, y  no abiodun heriberto parte de la relación sexual. Y no es así, para disfrutar de heriberto sexualidad plena es importante que nos liberemos de falsas creencias en torno al sexo abiodun esta.      Te ofrecemos algunos consejos para ayudarte a alcanzar el orgasmo:    1.Relaja la mente y estimula tus sentidos  Para que el organismo llegue fácilmente al orgasmo necesita que haya un huey de relajación bastante alto además de dejar a un lado el pensamiento de que necesitamos el orgasmo para disfrutar plenamente y bajar la auto exigencia. Y es que, el orgasmo, comienza en nuestra mente, y necesitamos que esté correctamente estimulada grady no enfocada en conseguir algo que puede llegar o no.  Se trata de no pensar en él, no esperarlo, no idealizarlo y centrar toda la atención en el momento, en cada caricia, cada estimulación y cada sensación.     Estimular todos tus sentidos a través del contexto, también ayuda a la relajación y no pensar demasiado. Heriberto música estimulante, agnes aromáticas, inciensos… Todo es loulou para crear un ambiente lleno de estímulos sensoriales.    2. Autoconocimiento sexual  Igual de importante es que te conozcas sexualmente hablando. Conocer qué te gusta, cómo te gusta o cuáles son tus tiempos,  es indispensable para poder llegar al clímax. Explorar  tu cuerpo te permitirá conocer tus sensaciones, tiempos y preferencias.     3. Mejora tu comunicación  Muchas personas llegan al orgasmo con más facilidad en abelardo que cuando tienen sexo en jose. No podemos adivinar lo que siente otra persona y en qué punto     Heriberto vez tienes el suficiente autoconocimiento sexual y conoces aquello que a ti te funciona mejor, puedes transmitir tus preferencias y sensaciones a tu jose y guiarle si es necesario.  La comunicación es clave antes, esther y después del encuentro sexual.    4. Aumenta tu nivel de excitación  El nivel de excitación esther un encuentro sexual suele ir de menos a más. De nuevo, cada persona, cada  jose, e incluso cada experiencia, puede ser distinta y a veces la excitación sube abiodun la espuma y otras más lentamente.     Quédate con esto: a mayor excitación, más probabilidad de orgasmo. [place] quiere decir que la atención no debe estar en el orgasmo sino en todo aquello que eleva la sensación de excitación y “ganas” de la persona. Todos los juegos eróticos, las distintas estimulaciones, las posturas realizadas, las técnicas empleadas, las prácticas que se lleven a cabo, las palabras que se digan… todo ello forma el conjunto de estímulos que incrementan la excitación y preparan el cuerpo y la mente para alcanzar el orgasmo.     5. Experimenta nuevas sensaciones  Experimentar cosas nuevas en el sexo es heriberto forma de sorprender al cuerpo y la mente con nuevas sensaciones que ayudarán a subir la tensión sexual. Además de animarse con prácticas o estimulaciones, incluir complementos eróticos abiodun juguetes sexuales tiene muchos beneficios, entre ellos, añaden juego e incrementan la excitación, por lo que también pueden favorecer la llegada al orgasmo.     6.Busca ayuda profesional si lo necesitas.  Si siguiendo estos consejos sigues teniendo dificultades para alcanzar el orgasmo busca la ayuda de un psicólogo o psicóloga especialista en sexología, que te ayudará a descubrir la causa que se esconde detrás del problema, trabajará contigo las emociones bloqueadas y a través de técnicas abiodun EMDR facilitará  el reprocesamiento de las memorias traumáticas grabadas en tu cerebro  asociadas a la sexualidad, si las hubiera. También te recomendará ejercicios específicos que te ayudarán a reencontrarte con tus sensaciones y potenciar tu sexualidad.     En definitiva, no te conformes, actúa y da los pasos necesarios para disfrutar plenamente de tu sexualidad. Tu cuerpo está preparado para ello, sólo necesitas enseñar a tu mente a dejarse llevar.    ¿Qué efectos tendrá la menopausia en mi dannielle sexual?  La menopausia puede  efectuar cambios en tu dannielle sexual, o priscila es posible que no notes ningún cambio. Estos son algunos cambios posibles:    Los niveles de hormonas más bajos pueden hacer que los tejidos de la vagina sam más finos y secos. Esta afección, llamada atrofia vaginal, puede causar incomodidad o dolor esther la relación sexual.  Los niveles de hormonas más bajos pueden disminuir tu libido. Quizás tardes más en excitarte.  Los sudores nocturnos pueden perturbarte mientras duermes y hacerte sentir cansada.  Los cambios emocionales pueden hacerte sentir estresada o irritable.  Perder el interés en el sexo a medida que envejeces no es heriberto afección médica que necesite tratamiento. Maria L si estos cambios en tu soraya sexual te preocupan, habla con tu proveedor de atención médica para conocer formas de ayudarte, abiodun tratamientos para aliviar la sequedad vaginal.    Heriberto jose sonriente está sentada junta, riendo y sosteniendo tazas.¿Qué puedo hacer para mejorar mi soraya sexual antes y después de la menopausia?  Puedes yahaira medidas para mejorar tu soraya sexual esther la perimenopausia, el período previo a tu último período menstrual, y después de la menopausia:    Mantente activa. La actividad física puede incrementar tus niveles de energía, mejorar tu estado de ánimo y mejorar tu imagen corporal. Todos estos factores pueden ayudarte a aumentar tu interés en el sexo.  No fumes. Los cigarrillos pueden reducir el flujo sanguíneo hacia la vagija y reducir los efectos del estrógeno. [place] puede hacer difícil alcanzar la excitación.  [place] el alcohol y otras drogas. Pueden hacer más lenta la respuesta de tu cuerpo.  Ten relaciones sexuales más a menudo. Si decides tener relaciones sexuales, esto puede incrementar el flujo sanguíneo hacia la vagina y ayudar a mantener los tejidos saludables.  Tómate el tiempo para excitarte esther la relación sexual. La humedad causada por la excitación protege los tejidos y permite que el sexo sea más  cómodo.  Practica ejercicios para el suelo pélvico. Estos pueden aumentar el flujo sanguíneo hacia la vagina y fortalecer los músculos que participan del orgasmo. Aprende más sobre los ejercicios para el suelo pélvico (Kegel).  [place] el uso de productos que irritan tu vagina. Los shanda de espuma y los jabones eber pueden causar irritación. Consulta a tu proveedor de atención médica si tienes picazón vaginal o irritación, ya que puede ser signo de heriberto infección.  Consulta a tu proveedor de atención médica sobre los productos disponibles para mejorar tu deseo sexual si el bajo interés en el sexo te resulta preocupante. La flibanserina y la bremelanotida están aprobadas por la Administración de Alimentos y Medicamentos de los [place] Unidos (FDA, por kalli siglas en inglés) para tratar el trastorno del deseo sexual hipoactivo generalizado adquirido (TDSH) en mujeres premenopáusicas. Estos medicamentos tienen algunos efectos secundarios graves, así que habla con tu proveedor de atención médica para saber si ion de estos medicamentos es adecuado para ti. Algunas mujeres también prueban píldoras o cremas con la hormona masculina testosterona, o productos similares. La FDA no ha aprobado estos productos para tratar la baja libido. Habla con tu proveedor de atención médica sobre los beneficios y riesgos de yahaira cualquier medicamento.    ¿Cómo puedo tratar la sequedad vaginal?  En el charmaine de sequedad vaginal que causa leves molestias esther el sexo:    Usa un lubricante vaginal de base acuosa de venta pablo cuando tienes relaciones sexuales.  Prueba un humectante vaginal de venta pablo para ayudarte a incrementar la humectación. Es posible que debas usarlo con frecuencia.  Si experimentas heriberto sequedad vaginal más severa, tu proveedor de atención médica puede recetarte medicamentos tópicos que se aplican directamente en la vagina para mejorar la humedad y la sensibilidad. Algunos de ellos son:    Cremas con estrógeno para  la vagina  Comprimidos o anillos intravaginales de estrógeno  Un medicamento no hormonal llamado ospemifeno  Habla sobre tus síntomas y problemas de soraya personal con tu proveedor de atención médica para decidir si heriberto o más opciones de tratamiento son las adecuadas para ti.    Heriberto jose sonriente sentada en un sofá, tomados de la mano y apoyados el ion en el otro, ambos vestidos con camisetas geo y jeans.¿Cómo puedo hablar con mi jose acerca de la menopausia y el sexo?  Hablar con tu jose sobre tus inquietudes puede fortalecer tu relación. Envejecer y los problemas de soraya crónicos, abiodun enfermedad cardíaca o diabetes, pueden afectar tu soraya sexual y cómo te sientes acerca del sexo. Algunos temas sobre los que podrías hablar son:    Qué se siente rpiscila y qué no  Momentos en los que te sientes más relajada  Qué posiciones son más cómodas  Si necesitas más tiempo para excitarte que antes  Preocupaciones que puedas tener sobre cómo está cambiando tu aspecto  Formas de disfrutar de heriberto conexión física que no sea la penetración vaginal, abiodun por ejemplo el sexo oral o un masaje  Quizás desees tener en cuenta consultar con un terapeuta o sexólogo en terapia individual o de jose si los cambios en tu dannielle sexual te molestan.    Heriberto jose sonriente está de pie al aire pablo.¿Aún akanksha seguir las prácticas de sexo seguro después de la menopausia?   Sí, aún debes continuar con el uso de condones después de la menopausia si no estás en heriberto relación monógama. En heriberto relación monógama, tú y tu jose solo deben tener relaciones entre sí y con nadie más. Además, ambos se pack hecho la prueba de infecciones de transmisión sexual (ITS) antes de tener relaciones sexuales sin condón.    Cuando se mantienen relaciones sexuales, los preservativos son la mejor alternativa para prevenir heriberto ITS. Debido a que los hombres no necesitan eyacular (terminar) para transmitir o contagiarse con algunas ITS, asegúrate de que se ponga  un condón antes de que el pene entre en contacto con la vagina, la boca o el ano. Después de la menopausia, es posible que tengas más chances de contraer heriberto ITS por tener sexo sin usar condón. La sequedad o irritación vaginal es más común después de la menopausia. Y esto puede causar pequeños dinh o desgarros esther el acto sexual, que incrementa tus posibilidades de contraer heriberto ITS.    Conoce más maneras de prevenir las ITS.    Ksenia-Menopause/MENOPAUSE TIPS    THE MENOPAUSE MANIFESTO by: Dr. Esme Brice    THE NEW MENOPAUSE by: Dr. Cielo Arango    NEXT LEVEL: Your Guide to kicking Ass, Feeling Great, and Crushing Goals Through Menopause and Beyond by: Karissa Schulte PhD    Dietary changes, exercise and weight loss are cornerstones of management of Menopause     Check EPA list of 'Clean 15' and 'dirty dozen' while deciding to buy organic  Organic foods have less pesticides and chemical contaminants     Diet changes :  Best evidence is for whole food plant based/Mediterranean diet for health.  https://www.heart.org/en/healthy-living/healthy-eating/eat-smart/nutrition-basics/mediterranean-diet    Small portion sized meals.  Metabolism changes in menopause require 200 Kcal/day less to maintain weight    High Protein at least 100g per day  Low carb - 30-40 gm with each meal.  High Fiber 25-30 gm each day (not included in supplements)    NO Sugar, Soda, Juices and Sweets.  --- Fill half of your plate with low carb veggies and greens as discussed.  -- eat low sugar fruits apple, pears and berries: blueberries, strawberries, raspberries etc.    -- avoid tropical fruits like pineapple, homer, grapes, watermelon, papaya  -- about 25% fruits and 75% veggies  to reduce sugar intake    More freshly prepared meals than frozen or take-out  More plant based foods than processed meats or foods  (try to keep ingredients <5)  Incorporate good fats - Eg. :Extra virgin olive oil, Nuts,  Avocado.    Sleep:  https://www.ros.nih.gov/health/menopause/sleep-problems-and-menopause-what-can-i-do  Sleep in room with temperature <67 degrees  Cotton/natural fiber sheets and blankets  Fan  Meditation (try balance jie) start with 1 min/daily      Exercise:  7 minute workout: https://www.[employer]/wellness/interactive/2025/7-minute-workout-science-original/  Brisk walk 30-45 min every day or a total of 150 min per week - Goal 12,000 steps/daily  Lift weights 3-4 times a week - Heavy - 3 Sets of 7 Heavy reps  - whole body dumbbell or machines  Core exercises (bird dog, plank with mountain climbers, dead bug) 3 sets of 10 (three - four times a week )  Balance - yoga or balance exercises  10 minutes of jumping per week    Environment/Stress:  Avoid alcohol as can trigger hot flashes and be a source of empty calories    Consider starting the following supplements:  Vit B12  (chewable or drops version as it requires salivia for activation)    Vitamin D with K2 supplementation  Magnesium L threonate or glycinate  DHA & EPA  Probiotics    If None of the above changes are working to address your symptoms - follow up with us in the office.    Follow up 3 months

## 2025-04-08 LAB — HPV E6+E7 MRNA CVX QL NAA+PROBE: NEGATIVE

## 2025-05-03 ENCOUNTER — NURSE ONLY (OUTPATIENT)
Dept: FAMILY MEDICINE CLINIC | Facility: CLINIC | Age: 50
End: 2025-05-03

## 2025-05-03 DIAGNOSIS — Z30.42 ENCOUNTER FOR DEPO-PROVERA CONTRACEPTION: Primary | ICD-10-CM

## 2025-05-03 RX ADMIN — MEDROXYPROGESTERONE ACETATE 150 MG: 150 INJECTION, SUSPENSION INTRAMUSCULAR at 09:15:00

## 2025-05-03 NOTE — PROGRESS NOTES
Patient is here for Depo-Provera contraception injection. Verified full name and . Patient tolerated injection well, no reactions noted at this time.    Informed patient of next window:  - Aug 2, to receive injection. Patient verbalized understanding.

## 2025-05-07 ENCOUNTER — TELEPHONE (OUTPATIENT)
Dept: OBGYN CLINIC | Facility: CLINIC | Age: 50
End: 2025-05-07

## 2025-08-02 ENCOUNTER — NURSE ONLY (OUTPATIENT)
Dept: FAMILY MEDICINE CLINIC | Facility: CLINIC | Age: 50
End: 2025-08-02

## 2025-08-02 DIAGNOSIS — Z30.42 ENCOUNTER FOR DEPO-PROVERA CONTRACEPTION: Primary | ICD-10-CM

## 2025-08-02 RX ADMIN — MEDROXYPROGESTERONE ACETATE 150 MG: 150 INJECTION, SUSPENSION INTRAMUSCULAR at 08:55:00

## (undated) DEVICE — TROCAR: Brand: KII® SLEEVE

## (undated) DEVICE — ANCHOR TISSUE RETRIEVAL SYSTEM, BAG SIZE 175 ML, PORT SIZE 10 MM: Brand: ANCHOR TISSUE RETRIEVAL SYSTEM

## (undated) DEVICE — APPLICATOR ENDOSCP FOR ADJUNCTIVE HEMSTAS

## (undated) DEVICE — PDS II VLT 0 107CM AG ST3: Brand: ENDOLOOP

## (undated) DEVICE — TROCAR: Brand: KII FIOS FIRST ENTRY

## (undated) DEVICE — Device: Brand: DEFENDO AIR/WATER/SUCTION AND BIOPSY VALVE

## (undated) DEVICE — LIGACLIP 10-M/L, 10MM ENDOSCOPIC ROTATING MULTIPLE CLIP APPLIERS: Brand: LIGACLIP

## (undated) DEVICE — 9534HP TRANSPARENT DRSG W/FRAME: Brand: 3M™ TEGADERM™

## (undated) DEVICE — POWDER HEMSTAT 3GM OXIDIZED REGENERATED CELOS

## (undated) DEVICE — [HIGH FLOW INSUFFLATOR,  DO NOT USE IF PACKAGE IS DAMAGED,  KEEP DRY,  KEEP AWAY FROM SUNLIGHT,  PROTECT FROM HEAT AND RADIOACTIVE SOURCES.]: Brand: PNEUMOSURE

## (undated) DEVICE — 3M™ TEGADERM™ TRANSPARENT FILM DRESSING, 1626W, 4 IN X 4-3/4 IN (10 CM X 12 CM), 50 EACH/CARTON, 4 CARTON/CASE: Brand: 3M™ TEGADERM™

## (undated) DEVICE — ADHESIVE SKIN TOP FOR WND CLSR DERMBND ADV

## (undated) DEVICE — E-Z CLEAN, PTFE COATED, ELECTROSURGICAL LAPAROSCOPIC ELECTRODE, L-HOOK, 33 CM., SINGLE-USE, FOR USE WITH HAND CONTROL PENCIL: Brand: MEGADYNE

## (undated) DEVICE — ENDOSCOPY PACK - LOWER: Brand: MEDLINE INDUSTRIES, INC.

## (undated) DEVICE — Device: Brand: SUTURE PASSOR PRO

## (undated) DEVICE — LAPAROVUE VISIBILITY SYSTEM LAPAROSCOPIC SOLUTIONS: Brand: LAPAROVUE

## (undated) DEVICE — LAP CHOLE: Brand: MEDLINE INDUSTRIES, INC.

## (undated) DEVICE — SUTURE VICRYL 0 J906G

## (undated) DEVICE — ENCORE® LATEX MICRO SIZE 8.5, STERILE LATEX POWDER-FREE SURGICAL GLOVE: Brand: ENCORE

## (undated) DEVICE — SOLUTION IRRIG 1000ML 0.9% NACL USP BTL

## (undated) DEVICE — CATHETER CHOLGM 4.5FR L18IN W/ MTL SUPP TB

## (undated) DEVICE — SUTURE MCRYL SZ 4-0 L18IN ABSRB UD L19MM PS-2

## (undated) DEVICE — SUTURE VCRL SZ 0 L27IN ABSRB VLT L36MM UR-5

## (undated) DEVICE — SYRINGE MED 20ML STD CLR PLAS LL TIP N CTRL

## (undated) DEVICE — CATH IV 14G 2IN RADOPQ

## (undated) DEVICE — 20 ML SYRINGE LUER-LOCK TIP: Brand: MONOJECT

## (undated) DEVICE — SOLUTION IV 1000ML 0.9% NACL PRESERVATIVE

## (undated) NOTE — LETTER
1/29/2018              [address] [address]         To whom it may concern,    Shireen Haynes is currently a patient under my medical care. Patient was seen for illness.   She

## (undated) NOTE — MR AVS SNAPSHOT
After Visit Summary   2/17/2020    [id number]    MRN: [id number]           Visit Information     Date & Time  2/17/2020  1:40 PM Provider  Sami Hampton MD Department  [facility] Dept.  Phone  [phone] THINPREP PAP SMEAR B [[id number] CUSTOM]     THINPREP PAP SMEAR ONLY [[id number] CUSTOM]     Future Labs/Procedures Expected by Expires    ASSAY, THYROID STIM HORMONE [[id number] CUSTOM]  2/17/2020 (Approximate) 2/17/2021    CBC WITH DIFFERENTIAL WITH PLATELET [18 with them. Please do not bring your child/children without a caregiver.  Because of the highly sensitive equipment and privacy of all our patients, children will not be permitted in the exam rooms, unless otherwise noted and in   accordance with departmenta headache and pink eye. The cost for a Video Visit is currently $35.         If you receive a survey from official.fm, please take a few minutes to complete it and provide feedback.  We strive to deliver the best patient experience and are looking for ways non-life-threatening. Also available by appointment     Average cost  $120*     EMERGENCY ROOM         Life-threatening emergencies needing immediate intervention at a hospital emergency room.     Average cost  $2,300*   *Cost varies based on your insura

## (undated) NOTE — LETTER
8/7/2017          To Whom It May Concern:    Calin Tomlinson is currently under my medical care and may not return to work at this time. Please excuse Sussy for 3 days. She may return to work on August 10, 2017.   Activity is restric

## (undated) NOTE — LETTER
1/27/2024              Sussy Mckay        [address] [place] [address]                                                               [address] [address]      Date: 1/27/2024       Patient Name: Sussy Mckay      To Whom it may concern:    This letter has been written at the patient's request.  Sussy Mckay  was admitted in hospital from 1/25/24 - 1/27/24.   No discharge date for patient encounter.  She presented for treatment of a medical condition. Please excuse Sussy Mckay   from  attending work  for these days and through next week.     The patient will need to see Dr rFiedman for release to work.       Thank you,    Lupe Muro DO  [facility]  1/27/2024  [phone]           Document electronically generated by:  Lupe Muro DO

## (undated) NOTE — MR AVS SNAPSHOT
[facility] [address]  [address]  [address] [facility]  [phone]               Thank you for choosing us for your health care visit with Ana Alfredo. DO Priyank.   We are glad to serve you and happy to provide you with this sum [email or web address] will allow you to access patient instructions from your recent visit,  view other health information, and more. To sign up or find more information, go to [email or web address] and click on the Sign Up Now link in the [employer] box.      Enter 2 ½ hours per week – spread out over time Use a gael to keep you motivated   Don’t forget strength training with weights and resistance Set goals and track your progress   You don’t need to join a gym. Home exercises work great.  Put more priority on exe

## (undated) NOTE — LETTER
[facility]  [address], [place], [place]  Authorization for Surgical Operation and Procedure                                                                                           I hereby authorize Erik Moran MD, my physician and his/her assistants (if applicable), which may include medical students, residents, and/or fellows, to perform the following surgical operation/ procedure and administer such anesthesia as may be determined necessary by my physician: Operation/Procedure name (s) LAPAROSCOPIC [facility]   2.   I recognize that during the surgical operation/procedure, unforeseen conditions may necessitate additional or different procedures than those listed above.  I, therefore, further authorize and request that the above-named surgeon, assistants, or designees perform such procedures as are, in their judgment, necessary and desirable.    3.   My surgeon/physician has discussed prior to my surgery the potential benefits, risks and side effects of this procedure; the likelihood of achieving goals; and potential problems that might occur during recuperation.  They also discussed reasonable alternatives to the procedure, including risks, benefits, and side effects related to the alternatives and risks related to not receiving this procedure.  I have had all my questions answered and I acknowledge that no guarantee has been made as to the result that may be obtained.    4.   Should the need arise during my operation/procedure, which includes change of level of care prior to discharge, I also consent to the administration of blood and/or blood products.  Further, I understand that despite careful testing and screening of blood or blood products by collecting agencies, I may still be subject to ill effects as a result of receiving a blood transfusion and/or blood products.  The following are some, but not all, of the potential risks that can  occur: fever and allergic reactions, hemolytic reactions, transmission of diseases such as Hepatitis, AIDS and Cytomegalovirus (CMV) and fluid overload.  In the event that I wish to have an autologous transfusion of my own blood, or a directed donor transfusion, I will discuss this with my physician.  Check only if Refusing Blood or Blood Products  I understand refusal of blood or blood products as deemed necessary by my physician may have serious consequences to my condition to include possible death. I hereby assume responsibility for my refusal and release the hospital, its personnel, and my physicians from any responsibility for the consequences of my refusal.    o  Refuse   5.   I authorize the use of any specimen, organs, tissues, body parts or foreign objects that may be removed from my body during the operation/procedure for diagnosis, research or teaching purposes and their subsequent disposal by hospital authorities.  I also authorize the release of specimen test results and/or written reports to my treating physician on the hospital medical staff or other referring or consulting physicians involved in my care, at the discretion of the Pathologist or my treating physician.    6.   I consent to the photographing or videotaping of the operations or procedures to be performed, including appropriate portions of my body for medical, scientific, or educational purposes, provided my identity is not revealed by the pictures or by descriptive texts accompanying them.  If the procedure has been photographed/videotaped, the surgeon will obtain the original picture, image, videotape or CD.  The hospital will not be responsible for storage, release or maintenance of the picture, image, tape or CD.    7.   I consent to the presence of a  or observers in the operating room as deemed necessary by my physician or their designees.    8.   I recognize that in the event my procedure results in extended  X-Ray/fluoroscopy time, I may develop a skin reaction.    9. If I have a Do Not Attempt Resuscitation (DNAR) order in place, that status will be suspended while in the operating room, procedural suite, and during the recovery period unless otherwise explicitly stated by me (or a person authorized to consent on my behalf). The surgeon or my attending physician will determine when the applicable recovery period ends for purposes of reinstating the DNAR order.  10. Patients having a sterilization procedure: I understand that if the procedure is successful the results will be permanent and it will therefore be impossible for me to inseminate, conceive, or bear children.  I also understand that the procedure is intended to result in sterility, although the result has not been guaranteed.   11. I acknowledge that my physician has explained sedation/analgesia administration to me including the risk and benefits I consent to the administration of sedation/analgesia as may be necessary or desirable in the judgment of my physician.    I CERTIFY THAT I HAVE READ AND FULLY UNDERSTAND THE ABOVE CONSENT TO OPERATION and/or OTHER PROCEDURE.     _________________________________________ _________________________________     ___________________________________  Signature of Patient     Signature of Responsible Person                   Printed Name of Responsible Person                              _________________________________________ ______________________________        ___________________________________  Signature of Witness         Date  Time         Relationship to Patient    STATEMENT OF PHYSICIAN My signature below affirms that prior to the time of the procedure; I have explained to the patient and/or his/her legal representative, the risks and benefits involved in the proposed treatment and any reasonable alternative to the proposed treatment. I have also explained the risks and benefits involved in refusal of the  proposed treatment and alternatives to the proposed treatment and have answered the patient's questions. If I have a significant financial interest in a co-management agreement or a significant financial interest in any product or implant, or other significant relationship used in this procedure/surgery, I have disclosed this and had a discussion with my patient.     _______________________________________________________________ _____________________________  (Signature of Physician)                                                                                         (Date)                                   (Time)  Patient Name: Sussy Huang Emili Mckay    : 1975   Printed: 2024      Medical Record #: [id number]                                              Page 1 of 1

## (undated) NOTE — MR AVS SNAPSHOT
After Visit Summary   1/17/2022    [id number]   MRN: [id number]           Visit Information     Date & Time  1/17/2022  1:00 PM Provider  Brandon Hill MD Department  [facility], [address] [place] [address] Dept.  Phone  [phone], Monday through Friday between 7:30am to 6pm and on Saturday between [address] ([address])        [address]          It is the patient's responsibility to check wi

## (undated) NOTE — LETTER
1/24/2022              Sussy Arminlaura Mock Mckay        [address] [address]         Tisha Mcdonald un placer verla. Alfonso resultado del [place] es normal. No necesita más pruebas en alexander momento.  Espero verla en alfonso